# Patient Record
Sex: FEMALE | Race: WHITE | NOT HISPANIC OR LATINO | Employment: FULL TIME | URBAN - METROPOLITAN AREA
[De-identification: names, ages, dates, MRNs, and addresses within clinical notes are randomized per-mention and may not be internally consistent; named-entity substitution may affect disease eponyms.]

---

## 2018-02-21 ENCOUNTER — TRANSFERRED RECORDS (OUTPATIENT)
Dept: HEALTH INFORMATION MANAGEMENT | Facility: CLINIC | Age: 18
End: 2018-02-21

## 2021-08-12 ENCOUNTER — LAB REQUISITION (OUTPATIENT)
Dept: LAB | Facility: CLINIC | Age: 21
End: 2021-08-12

## 2021-08-12 PROCEDURE — 86481 TB AG RESPONSE T-CELL SUSP: CPT | Performed by: INTERNAL MEDICINE

## 2021-08-12 PROCEDURE — 86706 HEP B SURFACE ANTIBODY: CPT | Performed by: INTERNAL MEDICINE

## 2021-08-13 LAB — HBV SURFACE AB SERPL IA-ACNC: 0.52 M[IU]/ML

## 2021-08-14 LAB
GAMMA INTERFERON BACKGROUND BLD IA-ACNC: 0.06 IU/ML
M TB IFN-G BLD-IMP: NEGATIVE
M TB IFN-G CD4+ BCKGRND COR BLD-ACNC: 9.94 IU/ML
MITOGEN IGNF BCKGRD COR BLD-ACNC: 0.01 IU/ML
MITOGEN IGNF BCKGRD COR BLD-ACNC: 0.04 IU/ML
QUANTIFERON MITOGEN: 10 IU/ML
QUANTIFERON NIL TUBE: 0.06 IU/ML
QUANTIFERON TB1 TUBE: 0.1 IU/ML
QUANTIFERON TB2 TUBE: 0.07

## 2022-05-05 ENCOUNTER — LAB (OUTPATIENT)
Dept: LAB | Facility: CLINIC | Age: 22
End: 2022-05-05
Payer: COMMERCIAL

## 2022-05-05 DIAGNOSIS — M06.9 RHEUMATOID ARTHRITIS (H): ICD-10-CM

## 2022-05-05 DIAGNOSIS — M06.9 RHEUMATOID ARTHRITIS (H): Primary | ICD-10-CM

## 2022-05-05 LAB
ERYTHROCYTE [DISTWIDTH] IN BLOOD BY AUTOMATED COUNT: 12.1 % (ref 10–15)
ERYTHROCYTE [SEDIMENTATION RATE] IN BLOOD BY WESTERGREN METHOD: 10 MM/HR (ref 0–20)
HCT VFR BLD AUTO: 40 % (ref 35–47)
HGB BLD-MCNC: 13.7 G/DL (ref 11.7–15.7)
MCH RBC QN AUTO: 31.7 PG (ref 26.5–33)
MCHC RBC AUTO-ENTMCNC: 34.3 G/DL (ref 31.5–36.5)
MCV RBC AUTO: 93 FL (ref 78–100)
PLATELET # BLD AUTO: 232 10E3/UL (ref 150–450)
RBC # BLD AUTO: 4.32 10E6/UL (ref 3.8–5.2)
WBC # BLD AUTO: 6.8 10E3/UL (ref 4–11)

## 2022-05-05 PROCEDURE — 85652 RBC SED RATE AUTOMATED: CPT

## 2022-05-05 PROCEDURE — 85027 COMPLETE CBC AUTOMATED: CPT

## 2022-05-05 PROCEDURE — 86140 C-REACTIVE PROTEIN: CPT

## 2022-05-05 PROCEDURE — 36415 COLL VENOUS BLD VENIPUNCTURE: CPT

## 2022-05-05 PROCEDURE — 82040 ASSAY OF SERUM ALBUMIN: CPT

## 2022-05-05 PROCEDURE — 82565 ASSAY OF CREATININE: CPT

## 2022-05-05 PROCEDURE — 84450 TRANSFERASE (AST) (SGOT): CPT

## 2022-05-06 LAB
ALBUMIN SERPL-MCNC: 3.9 G/DL (ref 3.4–5)
AST SERPL W P-5'-P-CCNC: 26 U/L (ref 0–45)
CREAT SERPL-MCNC: 0.87 MG/DL (ref 0.52–1.04)
CRP SERPL-MCNC: <2.9 MG/L (ref 0–8)
GFR SERPL CREATININE-BSD FRML MDRD: >90 ML/MIN/1.73M2

## 2022-05-17 ENCOUNTER — OFFICE VISIT (OUTPATIENT)
Dept: URGENT CARE | Facility: URGENT CARE | Age: 22
End: 2022-05-17
Payer: COMMERCIAL

## 2022-05-17 VITALS
SYSTOLIC BLOOD PRESSURE: 120 MMHG | TEMPERATURE: 98 F | OXYGEN SATURATION: 98 % | DIASTOLIC BLOOD PRESSURE: 78 MMHG | RESPIRATION RATE: 20 BRPM | HEART RATE: 78 BPM

## 2022-05-17 DIAGNOSIS — M62.830 SPASM OF MUSCLE OF LOWER BACK: ICD-10-CM

## 2022-05-17 DIAGNOSIS — S39.92XA INJURY OF LOW BACK, INITIAL ENCOUNTER: Primary | ICD-10-CM

## 2022-05-17 DIAGNOSIS — R31.9 URINARY TRACT INFECTION WITH HEMATURIA, SITE UNSPECIFIED: ICD-10-CM

## 2022-05-17 DIAGNOSIS — N39.0 URINARY TRACT INFECTION WITH HEMATURIA, SITE UNSPECIFIED: ICD-10-CM

## 2022-05-17 LAB
ALBUMIN UR-MCNC: NEGATIVE MG/DL
APPEARANCE UR: ABNORMAL
BACTERIA #/AREA URNS HPF: ABNORMAL /HPF
BILIRUB UR QL STRIP: NEGATIVE
COLOR UR AUTO: YELLOW
GLUCOSE UR STRIP-MCNC: NEGATIVE MG/DL
HGB UR QL STRIP: NEGATIVE
KETONES UR STRIP-MCNC: NEGATIVE MG/DL
LEUKOCYTE ESTERASE UR QL STRIP: ABNORMAL
NITRATE UR QL: NEGATIVE
PH UR STRIP: 8 [PH] (ref 5–7)
RBC #/AREA URNS AUTO: ABNORMAL /HPF
SP GR UR STRIP: 1.01 (ref 1–1.03)
SQUAMOUS #/AREA URNS AUTO: ABNORMAL /LPF
UROBILINOGEN UR STRIP-ACNC: 0.2 E.U./DL
WBC #/AREA URNS AUTO: ABNORMAL /HPF

## 2022-05-17 PROCEDURE — 99204 OFFICE O/P NEW MOD 45 MIN: CPT | Performed by: PHYSICIAN ASSISTANT

## 2022-05-17 PROCEDURE — 81001 URINALYSIS AUTO W/SCOPE: CPT | Performed by: PHYSICIAN ASSISTANT

## 2022-05-17 PROCEDURE — 87086 URINE CULTURE/COLONY COUNT: CPT | Performed by: PHYSICIAN ASSISTANT

## 2022-05-17 RX ORDER — NITROFURANTOIN 25; 75 MG/1; MG/1
100 CAPSULE ORAL 2 TIMES DAILY
Qty: 10 CAPSULE | Refills: 0 | Status: SHIPPED | OUTPATIENT
Start: 2022-05-17 | End: 2022-05-22

## 2022-05-17 RX ORDER — ADALIMUMAB 40MG/0.4ML
40 KIT SUBCUTANEOUS
COMMUNITY
Start: 2021-10-12 | End: 2023-06-13

## 2022-05-17 RX ORDER — METHOCARBAMOL 500 MG/1
500 TABLET, FILM COATED ORAL 3 TIMES DAILY
Qty: 28 TABLET | Refills: 0 | Status: SHIPPED | OUTPATIENT
Start: 2022-05-17 | End: 2023-04-10

## 2022-05-17 RX ORDER — IBUPROFEN 600 MG/1
600 TABLET, FILM COATED ORAL EVERY 6 HOURS PRN
Qty: 30 TABLET | Refills: 0 | Status: SHIPPED | OUTPATIENT
Start: 2022-05-17 | End: 2023-04-10

## 2022-05-17 NOTE — PROGRESS NOTES
Assessment & Plan     Injury of low back, initial encounter  Lumbar xray Negative for acute findings, read by Alexis Abbott PA-C [facility] at time of visit.  Ice compresses and warm moist compresses  Motrin for inflammation and pain    - ibuprofen (ADVIL/MOTRIN) 600 MG tablet; Take 1 tablet (600 mg) by mouth every 6 hours as needed for moderate pain    Spasm of muscle of lower back  Robaxin for muscle spasms prn  - XR Lumbar Spine 2/3 Views; Future  - ibuprofen (ADVIL/MOTRIN) 600 MG tablet; Take 1 tablet (600 mg) by mouth every 6 hours as needed for moderate pain  - methocarbamol (ROBAXIN) 500 MG tablet; Take 1 tablet (500 mg) by mouth 3 times daily    Urinary tract infection with hematuria, site unspecified  Urine normally doesn't have any germs (bacteria) in it. But bacteria can get into the urinary tract from the skin around the rectum. Or they can travel in the blood from other parts of the body. Once they are in your urinary tract, they can cause infection in these areas:    The urethra (urethritis)    The bladder (cystitis)    The kidneys (pyelonephritis)  The most common place for an infection is in the bladder. This is called a bladder infection. This is one of the most common infections in women. Most bladder infections are easily treated. They are not serious unless the infection spreads to the kidney.  The terms bladder infection, UTI, and cystitis are often used to describe the same thing. But they are not always the same. Cystitis is an inflammation of the bladder. The most common cause of cystitis is an infection.    - nitroFURantoin macrocrystal-monohydrate (MACROBID) 100 MG capsule; Take 1 capsule (100 mg) by mouth 2 times daily for 5 days       At today's visit with William Payne , we discussed results, diagnosis, medications and formulated a plan.  We also discussed red flags for immediate return to clinic/ER, as well as indications for follow up if no improvement. Patient understood and agreed to  plan. William Payne was discharged with stable vitals and has no further questions.       No follow-ups on file.    Alexis Abbott, [facility], PA-C  [facility] [place]    Results for orders placed or performed in visit on 05/17/22   XR Lumbar Spine 2/3 Views     Status: None (Preliminary result)    Narrative    LUMBAR SPINE TWO - THREE VIEWS 5/17/2022 10:38 AM     COMPARISON: None    HISTORY: Injury of low back, initial encounter; Spasm of muscle of  lower back.      Impression    IMPRESSION: Five lumbar type vertebrae. Normal alignment. Vertebral  body heights normal. No fractures. No significant degenerative change.   Results for orders placed or performed in visit on 05/17/22   UA with Microscopic reflex to Culture     Status: Abnormal    Specimen: Urine, Clean Catch   Result Value Ref Range    Color Urine Yellow Colorless, Straw, Light Yellow, Yellow    Appearance Urine Slightly Cloudy (A) Clear    Glucose Urine Negative Negative mg/dL    Bilirubin Urine Negative Negative    Ketones Urine Negative Negative mg/dL    Specific Gravity Urine 1.015 1.003 - 1.035    Blood Urine Negative Negative    pH Urine 8.0 (H) 5.0 - 7.0    Protein Albumin Urine Negative Negative mg/dL    Urobilinogen Urine 0.2 0.2, 1.0 E.U./dL    Nitrite Urine Negative Negative    Leukocyte Esterase Urine Small (A) Negative   Urine Microscopic     Status: Abnormal   Result Value Ref Range    Bacteria Urine Many (A) None Seen /HPF    RBC Urine 0-2 0-2 /HPF /HPF    WBC Urine 10-25 (A) 0-5 /HPF /HPF    Squamous Epithelials Urine Many (A) None Seen /LPF       Subjective   William is a 21 year old who presents for the following health issues     HPI     William Payne, 21 year old, female presents to the urgent care today with:   Musculoskeletal Problem (Back pain pt was lifting weights at the gym )      Review of Systems   Constitutional, HEENT, cardiovascular, pulmonary, gi and gu systems are negative, except as otherwise noted.       Objective    /78   Pulse 78   Temp 98  F (36.7  C)   Resp 20   SpO2 98%   There is no height or weight on file to calculate BMI.  Physical Exam   GENERAL: healthy, alert and no distress  ABDOMEN: soft, nontender, no hepatosplenomegaly, no masses and bowel sounds normal  MS: Positive for lower back tenderness and spasms  SKIN: no suspicious lesions or rashes  NEURO: Normal strength and tone, mentation intact and speech normal  PSYCH: mentation appears normal, affect normal/bright

## 2022-05-18 LAB — BACTERIA UR CULT: NORMAL

## 2022-08-26 ENCOUNTER — OFFICE VISIT (OUTPATIENT)
Dept: URGENT CARE | Facility: URGENT CARE | Age: 22
End: 2022-08-26
Payer: COMMERCIAL

## 2022-08-26 VITALS
DIASTOLIC BLOOD PRESSURE: 69 MMHG | OXYGEN SATURATION: 99 % | TEMPERATURE: 99.8 F | HEART RATE: 90 BPM | SYSTOLIC BLOOD PRESSURE: 110 MMHG

## 2022-08-26 DIAGNOSIS — J02.0 STREPTOCOCCAL PHARYNGITIS: Primary | ICD-10-CM

## 2022-08-26 DIAGNOSIS — R07.0 THROAT PAIN: ICD-10-CM

## 2022-08-26 LAB — DEPRECATED S PYO AG THROAT QL EIA: POSITIVE

## 2022-08-26 PROCEDURE — 87880 STREP A ASSAY W/OPTIC: CPT

## 2022-08-26 PROCEDURE — 99213 OFFICE O/P EST LOW 20 MIN: CPT | Performed by: FAMILY MEDICINE

## 2022-08-26 RX ORDER — PENICILLIN V POTASSIUM 500 MG/1
500 TABLET, FILM COATED ORAL 3 TIMES DAILY
Qty: 30 TABLET | Refills: 0 | Status: SHIPPED | OUTPATIENT
Start: 2022-08-26 | End: 2022-09-05

## 2022-08-26 NOTE — PATIENT INSTRUCTIONS
Take Tylenol and/or Ibuprofen    Do warm saltwater gargles    Drink plenty of ice-cold water to soothe the throat pain.      Follow up if not better in 7-10 days.

## 2022-08-26 NOTE — PROGRESS NOTES
SUBJECTIVE:   William Payne is a 22 year old female presenting with a chief complaint of fevers (up to 100.6 F) since two days ago and sore throat (worse with swallowing) for the past 2-3 days. .  Course of illness is worsening. .    Current and Associated symptoms: as listed above.  No stuffy nose.  No runny nose.  No loss of smell/taste.  No cough.  No bluish lips/toes/finers.  No vomiting/diarrhea.  No chest pain.  No abdominal pain.    Treatment measures tried include Tylenol, Dayquil, Nyquil.  .    Her August 23, 2022, at-home COVID-19 test was negative.  Patient has already already received a booster dose of a COVID-19 Pfizer vaccine.        Past Medical History:    Rheumatoid Arthritis.      Current Outpatient Medications   Medication Sig Dispense Refill     adalimumab (HUMIRA *CF* PEN) 40 MG/0.4ML pen kit Inject 40 mg Subcutaneous       ibuprofen (ADVIL/MOTRIN) 600 MG tablet Take 1 tablet (600 mg) by mouth every 6 hours as needed for moderate pain (Patient not taking: Reported on 8/26/2022) 30 tablet 0     methocarbamol (ROBAXIN) 500 MG tablet Take 1 tablet (500 mg) by mouth 3 times daily (Patient not taking: Reported on 8/26/2022) 28 tablet 0     Social History     Tobacco Use     Smoking status: Not on file     Smokeless tobacco: Not on file   Substance Use Topics     Alcohol use: Not on file       ROS:  CONSTITUTIONAL:positive for recent fevers.    ENT/MOUTH:  Positive for sore throat.    NEURO: positive for recent headache.      OBJECTIVE:  /69   Pulse 90   Temp 99.8  F (37.7  C) (Tympanic)   SpO2 99%   Breastfeeding No   GEN:  No acute distress.  No respiratory distress.    ear nose throat:  TMs and canals are within normal limits.  Oropharynx is erythematous without tonsillar exudates.  NECK:  There is some tenderness at the submandibular regions.      LAB:    Results for orders placed or performed in visit on 08/26/22   Streptococcus A Rapid Screen w/Reflex to PCR     Status: Abnormal     Specimen: Throat; Swab   Result Value Ref Range    Group A Strep antigen Positive (A) Negative         ASSESSMENT:  Strep Pharyngitis    PLAN:  Rx:  Penicillin VK.    Tylenol, ibuprofen  Warm saltwater gargles  Drink ice-cold water  follow up if not better in 7-10 days.       Timoteo Neri MD

## 2022-08-26 NOTE — LETTER
[facility] [place]  [address]  [address]  [place] [address] [phone]      August 26, 2022    RE:  William Payne                                                                                                                                                       [address]    [place] [address]            To whom it may concern:    William Payne is under my professional care at the [facility].  Because of her current medical illness, please excuse her absences from work on August 25 and August 26, 2022  She  may return to work on August 30, 2022, provided that she is feeling better.          Sincerely,        Timoteo Neri MD    [facility]gent [facility]

## 2022-09-17 ENCOUNTER — HEALTH MAINTENANCE LETTER (OUTPATIENT)
Age: 22
End: 2022-09-17

## 2022-11-18 ENCOUNTER — TRANSFERRED RECORDS (OUTPATIENT)
Dept: HEALTH INFORMATION MANAGEMENT | Facility: CLINIC | Age: 22
End: 2022-11-18

## 2022-11-18 ENCOUNTER — MEDICAL CORRESPONDENCE (OUTPATIENT)
Dept: HEALTH INFORMATION MANAGEMENT | Facility: CLINIC | Age: 22
End: 2022-11-18

## 2023-02-22 NOTE — TELEPHONE ENCOUNTER
NOTES Status Details   OFFICE NOTE from referring provider Care Everywhere /Received 11.18.2022 Leilani Leung, APRN, CNP [facility]    OFFICE NOTE from other specialist Care Everywhere 04.15.2022 Zoraida Darden APRN, CNP [facility]    03.10.2021 Tim Starr MD    DISCHARGE SUMMARY from hospital     DISCHARGE REPORT from the ER     MEDICATION LIST Care Everywhere    LABS (Any and all labs)      Care Everywhere    Biopsy/pathology (Anything related to diagnoses I.e. fluid aspirations, lip biopsy, muscle biopsy)               Imaging (All imaging related to diagnoses)     Echo     HRCT     CXR     EMG                    Scleroderma/Dermatomyositis diagnoses     Previous Cardiology notes      Previous Pulmonary notes     Previous Dermatology notes     Previous GI notes     Lupus diagnoses     Previous Nephrology notes     Previous Dermatology notes     Previous Cardiology notes

## 2023-04-10 ENCOUNTER — LAB (OUTPATIENT)
Dept: LAB | Facility: CLINIC | Age: 23
End: 2023-04-10
Attending: STUDENT IN AN ORGANIZED HEALTH CARE EDUCATION/TRAINING PROGRAM
Payer: COMMERCIAL

## 2023-04-10 ENCOUNTER — OFFICE VISIT (OUTPATIENT)
Dept: RHEUMATOLOGY | Facility: CLINIC | Age: 23
End: 2023-04-10
Attending: STUDENT IN AN ORGANIZED HEALTH CARE EDUCATION/TRAINING PROGRAM
Payer: COMMERCIAL

## 2023-04-10 ENCOUNTER — TELEPHONE (OUTPATIENT)
Dept: RHEUMATOLOGY | Facility: CLINIC | Age: 23
End: 2023-04-10

## 2023-04-10 VITALS
BODY MASS INDEX: 21.41 KG/M2 | HEART RATE: 88 BPM | WEIGHT: 128.53 LBS | OXYGEN SATURATION: 98 % | DIASTOLIC BLOOD PRESSURE: 81 MMHG | SYSTOLIC BLOOD PRESSURE: 121 MMHG | HEIGHT: 65 IN

## 2023-04-10 DIAGNOSIS — R76.8 CYCLIC CITRULLINATED PEPTIDE (CCP) ANTIBODY POSITIVE: ICD-10-CM

## 2023-04-10 DIAGNOSIS — M05.79 RHEUMATOID ARTHRITIS INVOLVING MULTIPLE SITES WITH POSITIVE RHEUMATOID FACTOR (H): Primary | ICD-10-CM

## 2023-04-10 DIAGNOSIS — Z79.899 HIGH RISK MEDICATION USE: ICD-10-CM

## 2023-04-10 DIAGNOSIS — M05.79 RHEUMATOID ARTHRITIS INVOLVING MULTIPLE SITES WITH POSITIVE RHEUMATOID FACTOR (H): ICD-10-CM

## 2023-04-10 LAB
ALP SERPL-CCNC: 43 U/L (ref 35–104)
ALT SERPL W P-5'-P-CCNC: 22 U/L (ref 10–35)
AST SERPL W P-5'-P-CCNC: 37 U/L (ref 10–35)
BASOPHILS # BLD AUTO: 0 10E3/UL (ref 0–0.2)
BASOPHILS NFR BLD AUTO: 1 %
CREAT SERPL-MCNC: 0.8 MG/DL (ref 0.51–0.95)
EOSINOPHIL # BLD AUTO: 0.3 10E3/UL (ref 0–0.7)
EOSINOPHIL NFR BLD AUTO: 4 %
ERYTHROCYTE [DISTWIDTH] IN BLOOD BY AUTOMATED COUNT: 11.8 % (ref 10–15)
GFR SERPL CREATININE-BSD FRML MDRD: >90 ML/MIN/1.73M2
HCT VFR BLD AUTO: 41 % (ref 35–47)
HGB BLD-MCNC: 14.2 G/DL (ref 11.7–15.7)
IMM GRANULOCYTES # BLD: 0 10E3/UL
IMM GRANULOCYTES NFR BLD: 0 %
LYMPHOCYTES # BLD AUTO: 2.8 10E3/UL (ref 0.8–5.3)
LYMPHOCYTES NFR BLD AUTO: 43 %
MCH RBC QN AUTO: 31.2 PG (ref 26.5–33)
MCHC RBC AUTO-ENTMCNC: 34.6 G/DL (ref 31.5–36.5)
MCV RBC AUTO: 90 FL (ref 78–100)
MONOCYTES # BLD AUTO: 0.4 10E3/UL (ref 0–1.3)
MONOCYTES NFR BLD AUTO: 6 %
NEUTROPHILS # BLD AUTO: 3 10E3/UL (ref 1.6–8.3)
NEUTROPHILS NFR BLD AUTO: 46 %
NRBC # BLD AUTO: 0 10E3/UL
NRBC BLD AUTO-RTO: 0 /100
PLATELET # BLD AUTO: 224 10E3/UL (ref 150–450)
RBC # BLD AUTO: 4.55 10E6/UL (ref 3.8–5.2)
WBC # BLD AUTO: 6.5 10E3/UL (ref 4–11)

## 2023-04-10 PROCEDURE — 99205 OFFICE O/P NEW HI 60 MIN: CPT | Performed by: STUDENT IN AN ORGANIZED HEALTH CARE EDUCATION/TRAINING PROGRAM

## 2023-04-10 PROCEDURE — 36415 COLL VENOUS BLD VENIPUNCTURE: CPT

## 2023-04-10 PROCEDURE — 82565 ASSAY OF CREATININE: CPT

## 2023-04-10 PROCEDURE — 84450 TRANSFERASE (AST) (SGOT): CPT

## 2023-04-10 PROCEDURE — 84460 ALANINE AMINO (ALT) (SGPT): CPT

## 2023-04-10 PROCEDURE — 86200 CCP ANTIBODY: CPT

## 2023-04-10 PROCEDURE — 86431 RHEUMATOID FACTOR QUANT: CPT

## 2023-04-10 PROCEDURE — 84075 ASSAY ALKALINE PHOSPHATASE: CPT

## 2023-04-10 PROCEDURE — 85018 HEMOGLOBIN: CPT

## 2023-04-10 PROCEDURE — 99417 PROLNG OP E/M EACH 15 MIN: CPT | Performed by: STUDENT IN AN ORGANIZED HEALTH CARE EDUCATION/TRAINING PROGRAM

## 2023-04-10 PROCEDURE — G0463 HOSPITAL OUTPT CLINIC VISIT: HCPCS | Performed by: STUDENT IN AN ORGANIZED HEALTH CARE EDUCATION/TRAINING PROGRAM

## 2023-04-10 RX ORDER — DROSPIRENONE AND ETHINYL ESTRADIOL 0.02-3(28)
1 KIT ORAL DAILY
COMMUNITY
Start: 2023-01-31 | End: 2023-09-07

## 2023-04-10 RX ORDER — AZELAIC ACID 0.15 G/G
GEL TOPICAL 2 TIMES DAILY
COMMUNITY
Start: 2022-04-05 | End: 2023-09-07

## 2023-04-10 ASSESSMENT — PAIN SCALES - GENERAL: PAINLEVEL: NO PAIN (0)

## 2023-04-10 NOTE — PROGRESS NOTES
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 Haas in July 2014 due to bilateral shoulder pain and hand pain with swelling. Laboratory data noted a positive FIOR of 2.52, a CCP antibody greater than 250 with a negative rheumatoid factor, negative Lyme, normal CRP and sed rate, normal vitamin D level, negative HLA-B27, and normal cell counts. She also had a negative dsDNA slightly low C4 at 12 (normal 13 and above) with a normal C3. Patient has most recently followed with Dr. Starr in the rheumatology department, and her last appointment was 4/15/2022 with JACE Oh CNP.    Julito in high school, officially diagnosed at 13. Aches and pain in random areas. Lower back, then alternating wrists, sternoclavicular joints. Volleyball, basketball, ran track and kept attributing it to sports injuries. Was started on plaquenil, then started methotrexate (7-8 tablets once a week), then switched to humira + plaquenil, . Triggers for flare are stress, sweets, poor sleep. Stopped plaquenil after college and has been doing well. Currently no morning stiffness, no stiffness throughout the day, no pain. When she flares it's her ternoclavicular and wrists, this responds to ibuprofen. Overall, involved joints have included groin (unclear if projecting from SIJ v. Hip), shoulders, wrists, MCPs, sternoclavicular.    No raynaud's, no miscarriage history, no pregnancies.    ROS: 10 point ROS neg other than the symptoms noted above in the HPI.   ALLERGIES: Patient has no known allergies.   MEDS: personally reviewed, notable for Humira every 14 days    PRIOR RHEUM MEDS:  Plaquenil 10/20/2014-Fall 2021 (did not feel it was needed any longer, 7 years total)  Methotrexate 11/20/2015-2019: this worked but caused oral ulcers despite folic acid and some hair loss  Humira  "2019-present      PMHx:  No past medical history on file.     PSHx:  No past surgical history on file.    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   Mother with psoriatic arthritis  Maternal grandmother with rheumatoid arthritis, as does aunt      PHYSICAL EXAM  Vitals: /81 (BP Location: Left arm, Patient Position: Sitting, Cuff Size: Adult Regular)   Pulse 88   Ht 1.651 m (5' 5\")   Wt 58.3 kg (128 lb 8.5 oz)   SpO2 98%   BMI 21.39 kg/m    BMI= Body mass index is 21.39 kg/m .   General: awake, alert  HEENT: face symmetric, EOMI, sclera anicteric and without injection  Resp: breathing comfortably on room air  CV: warm, well perfused extremities  MSK: on swelling or erythema or tenderness of DIPs, PIPs, MCPs, wrists. Right sternoclavicular joint feels and appears larger than left but it not painful and full ROM of right arm intact.  Skin: no rashes noted on visible skin of face, ears, arms      LAB REVIEW:      Latest Ref Rng & Units 8/12/2021     3:11 PM 5/5/2022     1:02 PM   RHEUM RESULTS   Albumin 3.4 - 5.0 g/dL  3.9     AST 0 - 45 U/L  26     Creatinine 0.52 - 1.04 mg/dL  0.87     CRP Inflammation 0.0 - 8.0 mg/L  <2.9     GFR Estimate >60 mL/min/1.73m2  >90     Hematocrit 35.0 - 47.0 %  40.0     Hemoglobin 11.7 - 15.7 g/dL  13.7     WBC 4.0 - 11.0 10e3/uL  6.8     RBC Count 3.80 - 5.20 10e6/uL  4.32     RDW 10.0 - 15.0 %  12.1     MCHC 31.5 - 36.5 g/dL  34.3     MCV 78 - 100 fL  93     Platelet Count 150 - 450 10e3/uL  232     Sed Rate 0 - 20 mm/hr  10     Quantiferon-TB Gold Plus Result Negative Negative        She has had low C4s in the past.    No results found for: YECENIA, ANAP1, ANAT1, ANAP2, ANAT2, ANAP3, ANAT3, RHF, CCPIGG, DNA, RNPIGG, SMIGG, SSAIGG, SSBIGG, RNAPG, SCLIGG, CENTA, HSTO, B2GPG, B2GPM, B2GPA, LUPINT, CARDG, CARDM, CARDA, ANCAT, ANCAP, PR3IGG, " MPOIGG     Lab Results   Component Value Date/Time    TBRES Negative 2021 03:11 PM   2022 HSV-1 positive  2022 COVID-19+  22 HPV+, endocervix with CIN1 on 1/23/23  4/15/2022 Quant gold negative     IMAGIN22 right hand XR in [facility], cannot review images personally: No fractures or DJD changes. No bony erosions. No abnormalities of the third MP joints. The wrist is normal   19 Right upper extremity US in [facility] noted for triceps muscle tear      ASSESSMENT: 21 yo with CCP+ POPPY that is the childhood equivalent of Rheumatoid arthritis (RA) who is well controlled on humira.    DISCUSSION: Her POPPY involves both small and large joints, ranging from MCPS and wrists to shoulders, possibly hips, and sternoclavicular. Given sternoclavicular involvement, SAPHO is on the differential however this joint is more frequently involved in RA than was previously recognized and is now able to be identified by ultrasound (see https://doi.org/10.1002/acr.65190, particularly synovitis and erosions). Will repeat CCP and RF today to confirm noted labs as CCP titer corresponds with severity of disease and her transiently low (now normalized) C4s may represent immune complex activation via RF. She does have a history of a positive FIOR but this is common in RA and she has no symptoms concerning for connective tissue diseases such as lupus nor has she had any issues with eye inflammation. We did briefly discuss that I would appreciate a heads up when she starts to think about pregnancy. Finally, we discussed that as needed use of ibuprofen is safe but that if she finds herself using it very often to let me know.    DIAGNOSIS:  ## Rheumatoid Arthritis/POPPY, +CCP, transient low C4, involving groin (unclear if projecting from SIJ v. Hip), shoulders, wrists (bilateral, typically alternating), MCPs (seb right 2nd MCP), R. Sternoclavicular  ## positive FIOR without eye inflammation history  ## high risk  medication use    PLAN:  1. I will take over humira prescription today  2. Standing labs for medication monitoring places and to be drawn every 3-4 months    RTC in 6 months, sooner if needed  Alex Johnson MD, PhD  Rheumatology     80 minutes were spent on the date of encounter doing chart review, history and exam, documentation, care coordination, and further activities as noted above.

## 2023-04-10 NOTE — LETTER
4/10/2023       RE: William Payne  [address]  [address]  [place] [address]     Dear Colleague,    Thank you for referring your patient, William Payne, to the [facility] RHEUMATOLOGY at [facility]. Please see a copy of my visit note below.    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 Haas in July 2014 due to bilateral shoulder pain and hand pain with swelling. Laboratory data noted a positive FIOR of 2.52, a CCP antibody greater than 250 with a negative rheumatoid factor, negative Lyme, normal CRP and sed rate, normal vitamin D level, negative HLA-B27, and normal cell counts. She also had a negative dsDNA slightly low C4 at 12 (normal 13 and above) with a normal C3. Patient has most recently followed with Dr. Starr in the rheumatology department, and her last appointment was 4/15/2022 with JACE Oh CNP.    Julito in high school, officially diagnosed at 13. Aches and pain in random areas. Lower back, then alternating wrists, sternoclavicular joints. Volleyball, basketball, ran track and kept attributing it to sports injuries. Was started on plaquenil, then started methotrexate (7-8 tablets once a week), then switched to humira + plaquenil, . Triggers for flare are stress, sweets, poor sleep. Stopped plaquenil after college and has been doing well. Currently no morning stiffness, no stiffness throughout the day, no pain. When she flares it's her ternoclavicular and wrists, this responds to ibuprofen. Overall, involved joints have included groin (unclear if projecting from SIJ v. Hip), shoulders, wrists, MCPs, sternoclavicular.    No raynaud's, no miscarriage history, no pregnancies.    ROS: 10 point ROS neg other than the symptoms noted above in the HPI.   ALLERGIES:  "Patient has no known allergies.   MEDS: personally reviewed, notable for Humira every 14 days    PRIOR RHEUM MEDS:  Plaquenil 10/20/2014-Fall 2021 (did not feel it was needed any longer, 7 years total)  Methotrexate 11/20/2015-2019: this worked but caused oral ulcers despite folic acid and some hair loss  Humira 2019-present      PMHx:  No past medical history on file.     PSHx:  No past surgical history on file.    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   Mother with psoriatic arthritis  Maternal grandmother with rheumatoid arthritis, as does aunt      PHYSICAL EXAM  Vitals: /81 (BP Location: Left arm, Patient Position: Sitting, Cuff Size: Adult Regular)   Pulse 88   Ht 1.651 m (5' 5\")   Wt 58.3 kg (128 lb 8.5 oz)   SpO2 98%   BMI 21.39 kg/m    BMI= Body mass index is 21.39 kg/m .   General: awake, alert  HEENT: face symmetric, EOMI, sclera anicteric and without injection  Resp: breathing comfortably on room air  CV: warm, well perfused extremities  MSK: on swelling or erythema or tenderness of DIPs, PIPs, MCPs, wrists. Right sternoclavicular joint feels and appears larger than left but it not painful and full ROM of right arm intact.  Skin: no rashes noted on visible skin of face, ears, arms      LAB REVIEW:      Latest Ref Rng & Units 8/12/2021     3:11 PM 5/5/2022     1:02 PM   RHEUM RESULTS   Albumin 3.4 - 5.0 g/dL  3.9     AST 0 - 45 U/L  26     Creatinine 0.52 - 1.04 mg/dL  0.87     CRP Inflammation 0.0 - 8.0 mg/L  <2.9     GFR Estimate >60 mL/min/1.73m2  >90     Hematocrit 35.0 - 47.0 %  40.0     Hemoglobin 11.7 - 15.7 g/dL  13.7     WBC 4.0 - 11.0 10e3/uL  6.8     RBC Count 3.80 - 5.20 10e6/uL  4.32     RDW 10.0 - 15.0 %  12.1     MCHC 31.5 - 36.5 g/dL  34.3     MCV 78 - 100 fL  93     Platelet Count 150 - 450 10e3/uL  232     Sed Rate 0 - 20 mm/hr  10   "   Quantiferon-TB Gold Plus Result Negative Negative        She has had low C4s in the past.    No results found for: YECENIA, ANAP1, ANAT1, ANAP2, ANAT2, ANAP3, ANAT3, RHF, CCPIGG, DNA, RNPIGG, SMIGG, SSAIGG, SSBIGG, RNAPG, SCLIGG, CENTA, HSTO, B2GPG, B2GPM, B2GPA, LUPINT, CARDG, CARDM, CARDA, ANCAT, ANCAP, PR3IGG, MPOIGG     Lab Results   Component Value Date/Time    TBRES Negative 2021 03:11 PM   2022 HSV-1 positive  2022 COVID-19+  22 HPV+, endocervix with CIN1 on 1/23/23  4/15/2022 Quant gold negative     IMAGIN22 right hand XR in [facility], cannot review images personally: No fractures or DJD changes. No bony erosions. No abnormalities of the third MP joints. The wrist is normal   19 Right upper extremity US in [facility] noted for triceps muscle tear      ASSESSMENT: 23 yo with CCP+ POPPY that is the childhood equivalent of Rheumatoid arthritis (RA) who is well controlled on humira.    DISCUSSION: Her POPPY involves both small and large joints, ranging from MCPS and wrists to shoulders, possibly hips, and sternoclavicular. Given sternoclavicular involvement, SAPHO is on the differential however this joint is more frequently involved in RA than was previously recognized and is now able to be identified by ultrasound (see https://doi.org/10.1002/acr.06758, particularly synovitis and erosions). Will repeat CCP and RF today to confirm noted labs as CCP titer corresponds with severity of disease and her transiently low (now normalized) C4s may represent immune complex activation via RF. She does have a history of a positive FIOR but this is common in RA and she has no symptoms concerning for connective tissue diseases such as lupus nor has she had any issues with eye inflammation. We did briefly discuss that I would appreciate a heads up when she starts to think about pregnancy. Finally, we discussed that as needed use of ibuprofen is safe but that if she finds herself using it very often  to let me know.    DIAGNOSIS:  ## Rheumatoid Arthritis/POPPY, +CCP, transient low C4, involving groin (unclear if projecting from SIJ v. Hip), shoulders, wrists (bilateral, typically alternating), MCPs (seb right 2nd MCP), R. Sternoclavicular  ## positive FIOR without eye inflammation history  ## high risk medication use    PLAN:  1. I will take over humira prescription today  2. Standing labs for medication monitoring places and to be drawn every 3-4 months    RTC in 6 months, sooner if needed  Alex Johnson MD, PhD  Rheumatology     80 minutes were spent on the date of encounter doing chart review, history and exam, documentation, care coordination, and further activities as noted above.

## 2023-04-10 NOTE — PATIENT INSTRUCTIONS
Welcome to our rheumatology practice!  I have ordered labs for today and medication monitoring labs to be done every 3-4 months.  I will take over the humira prescription  See you in 6 months

## 2023-04-10 NOTE — NURSING NOTE
"Chief Complaint   Patient presents with     Consult     Consult for polyarticular juvenile arthritis     /81 (BP Location: Left arm, Patient Position: Sitting, Cuff Size: Adult Regular)   Pulse 88   Ht 1.651 m (5' 5\")   Wt 58.3 kg (128 lb 8.5 oz)   SpO2 98%   BMI 21.39 kg/m      Milagros Hewitt on 4/10/2023 at 11:33 AM    "

## 2023-04-11 LAB — RHEUMATOID FACT SER NEPH-ACNC: <7 IU/ML

## 2023-04-12 ENCOUNTER — MYC MEDICAL ADVICE (OUTPATIENT)
Dept: RHEUMATOLOGY | Facility: CLINIC | Age: 23
End: 2023-04-12
Payer: COMMERCIAL

## 2023-04-12 LAB — CCP AB SER IA-ACNC: 217 U/ML

## 2023-04-13 NOTE — TELEPHONE ENCOUNTER
MyC acknowledgement message sent to patient noting her questions on the elevated CCP and AST have been routed to Dr. Buckley for review.    Rashmi Barahona RN  Rheumatology Clinic

## 2023-05-11 ENCOUNTER — MYC MEDICAL ADVICE (OUTPATIENT)
Dept: RHEUMATOLOGY | Facility: CLINIC | Age: 23
End: 2023-05-11
Payer: COMMERCIAL

## 2023-05-11 DIAGNOSIS — M05.79 RHEUMATOID ARTHRITIS INVOLVING MULTIPLE SITES WITH POSITIVE RHEUMATOID FACTOR (H): Primary | ICD-10-CM

## 2023-05-11 NOTE — TELEPHONE ENCOUNTER
Patient reporting flare symptoms that she woke up with this am, had not noticed any symptoms prior.      Right Thumb painful, stiff, some swelling    Chart Review:  Established w/ Adult Rheum 4/10 Dr. Buckley    Dx: Rheumatoid Arthritis/POPPY  +CCP, transient low C4, involving groin (unclear if projecting from SIJ v. Hip), shoulders, wrists (bilateral, typically alternating), MCPs (seb right 2nd MCP), R. Sternoclavicular  positive FIOR without eye inflammation history    Current Med:  Humira 40mg INJ every 14 days    Last labs 4/10 continuing to monitor her AST    Patient to have follow up w/ Dr. Hernandez and is on the wait list.    Route to provider for review and MyC acknowledgment response to patient.    Rashmi Barahona RN  Rheumatology Clinic

## 2023-05-12 RX ORDER — PREDNISONE 5 MG/1
TABLET ORAL
Qty: 18 TABLET | Refills: 0 | Status: SHIPPED | OUTPATIENT
Start: 2023-05-12 | End: 2023-05-21

## 2023-05-12 NOTE — TELEPHONE ENCOUNTER
Patient updated per Dr. Buckley's message:    Start it today at whatever time then she should move the dose to mornings.   15 mg x 3 days   10 mg x 3 days   5 mg x 3 days   If symptoms recur as she steps down she should let us know and we can slow the taper     Rashmi Barahona RN  Rheumatology Clinic

## 2023-05-18 ENCOUNTER — PRE VISIT (OUTPATIENT)
Dept: RHEUMATOLOGY | Facility: CLINIC | Age: 23
End: 2023-05-18
Payer: COMMERCIAL

## 2023-05-19 NOTE — TELEPHONE ENCOUNTER
LVM and MyC message sent to check on patient symptoms after starting the Prednisone taper last week.    Rashmi Barahona RN  Rheumatology Clinic

## 2023-06-13 ENCOUNTER — TELEPHONE (OUTPATIENT)
Dept: RHEUMATOLOGY | Facility: CLINIC | Age: 23
End: 2023-06-13
Payer: COMMERCIAL

## 2023-06-13 DIAGNOSIS — M05.79 RHEUMATOID ARTHRITIS INVOLVING MULTIPLE SITES WITH POSITIVE RHEUMATOID FACTOR (H): ICD-10-CM

## 2023-06-13 DIAGNOSIS — R76.8 CYCLIC CITRULLINATED PEPTIDE (CCP) ANTIBODY POSITIVE: ICD-10-CM

## 2023-06-13 NOTE — TELEPHONE ENCOUNTER
[facility] Call Center    Phone Message    May a detailed message be left on voicemail: yes     Reason for Call: Medication Question or concern regarding medication   Prescription Clarification   Name of Medication: Humira    Prescribing Provider: Dr. Johnson     Pharmacy: [facility]     What on the order needs clarification? Pt was told by [facility] that she cannot receive her medications through [facility] with her current insurance.    Pt needs to use only [facility].     Please contact the Pt back with any questions or concerns.      Action Taken: Message routed to:  [facility] ([facility] Adult Rheumatology

## 2023-06-13 NOTE — TELEPHONE ENCOUNTER
Resent Humira order to correct specialty pharmacy per [facility]. Unclear why prescription was [facility] as she has never filled there. Called and LM for patient informing her of sent prescription and provided phone number of [facility] specialty pharmacy for her to contact to rush next refill shipment.     Nya Michael, PharmD  Medication Therapy Management Pharmacist  [facility] Rheumatology [facility]

## 2023-06-14 ENCOUNTER — OFFICE VISIT (OUTPATIENT)
Dept: URGENT CARE | Facility: URGENT CARE | Age: 23
End: 2023-06-14
Payer: COMMERCIAL

## 2023-06-14 ENCOUNTER — NURSE TRIAGE (OUTPATIENT)
Dept: NURSING | Facility: CLINIC | Age: 23
End: 2023-06-14
Payer: COMMERCIAL

## 2023-06-14 VITALS
HEART RATE: 115 BPM | WEIGHT: 128 LBS | OXYGEN SATURATION: 99 % | TEMPERATURE: 99.9 F | BODY MASS INDEX: 21.3 KG/M2 | DIASTOLIC BLOOD PRESSURE: 73 MMHG | SYSTOLIC BLOOD PRESSURE: 128 MMHG

## 2023-06-14 DIAGNOSIS — J02.9 VIRAL PHARYNGITIS: Primary | ICD-10-CM

## 2023-06-14 LAB
DEPRECATED S PYO AG THROAT QL EIA: NEGATIVE
GROUP A STREP BY PCR: NOT DETECTED

## 2023-06-14 PROCEDURE — 87651 STREP A DNA AMP PROBE: CPT | Performed by: PHYSICIAN ASSISTANT

## 2023-06-14 PROCEDURE — 99213 OFFICE O/P EST LOW 20 MIN: CPT | Performed by: PHYSICIAN ASSISTANT

## 2023-06-14 NOTE — TELEPHONE ENCOUNTER
Patient states that she gets strep a lot.  Patient had strep 4 times in the past 10 months.  Patient today has a sore throat and popping ears.  Patient denies breathing difficulty.  Patient not sure if she has a fever.  Patient is staying hydrated.  Patient states that she sees white spots on her throat.  Patient states that she will go to [facility].      Reason for Disposition    Pus on tonsils (back of throat) and swollen neck lymph nodes ('glands')    Additional Information    Negative: SEVERE difficulty breathing (e.g., struggling for each breath, speaks in single words)    Negative: Sounds like a life-threatening emergency to the triager    Negative: Drooling or spitting out saliva (because can't swallow)    Negative: Unable to open mouth completely    Negative: Drinking very little and has signs of dehydration (e.g., no urine > 12 hours, very dry mouth, very lightheaded)    Negative: Patient sounds very sick or weak to the triager    Negative: Difficulty breathing (per caller) but not severe    Negative: Fever > 103 F (39.4 C)    Negative: Refuses to drink anything for > 12 hours    Negative: SEVERE sore throat pain    Protocols used: SORE THROAT-A-OH

## 2023-06-14 NOTE — PROGRESS NOTES
URGENT CARE VISIT:    SUBJECTIVE:   William Payne is a 22 year old female presenting with a chief complaint of sore throat.  Onset was 1 day(s) ago.   She denies the following symptoms: stuffy nose and cough - non-productive  Course of illness is same.    Treatment measures tried include none tried with no relief of symptoms.  Predisposing factors include None.    PMH: History reviewed. No pertinent past medical history.  Allergies: Seasonal allergies and Citrullus vulgaris   Medications:   Current Outpatient Medications   Medication Sig Dispense Refill     adalimumab (HUMIRA *CF*) 40 MG/0.4ML pen kit Inject 0.4 mLs (40 mg) Subcutaneous every 14 days Hold for signs of infection, then seek medical attention. 2.4 mL 3     azelaic acid (FINACIA) 15 % external gel Apply topically 2 times daily       drospirenone-ethinyl estradiol (BRANDI) 3-0.02 MG tablet Take 1 tablet by mouth daily       Social History:   Social History     Tobacco Use     Smoking status: Never     Smokeless tobacco: Never   Vaping Use     Vaping status: Not on file   Substance Use Topics     Alcohol use: Not on file       ROS:  Review of systems negative except as stated above.    OBJECTIVE:  /73 (BP Location: Right arm, Patient Position: Sitting, Cuff Size: Adult Regular)   Pulse 115   Temp 99.9  F (37.7  C) (Oral)   Wt 58.1 kg (128 lb)   SpO2 99%   BMI 21.30 kg/m    GENERAL APPEARANCE: healthy, alert and no distress  EYES: EOMI,  PERRL, conjunctiva clear  HENT: ear canals and TM's normal.  Mildly erythematous oropharynx  NECK: supple, nontender, no lymphadenopathy  RESP: lungs clear to auscultation - no rales, rhonchi or wheezes  CV: regular rates and rhythm, normal S1 S2, no murmur noted  SKIN: no suspicious lesions or rashes    Labs:    Results for orders placed or performed in visit on 06/14/23   Streptococcus A Rapid Screen w/Reflex to PCR     Status: Normal    Specimen: Throat; Swab   Result Value Ref Range    Group A Strep antigen  Negative Negative       ASSESSMENT:    ICD-10-CM    1. Viral pharyngitis  J02.9 Streptococcus A Rapid Screen w/Reflex to PCR     Group A Streptococcus PCR Throat Swab          PLAN:  Patient Instructions   Patient was educated on the natural course of viral throat infection. Conservative measures discussed including warm fluids, salt water gargles, Lozenges (Cepacol), and over-the-counter analgesics (Tylenol or Ibuprofen). See your primary care provider if symptoms worsen or do not improve in 7 days. Seek emergency care if you develop severe throat pain, or difficulty swallowing.     Patient verbalized understanding and is agreeable to plan. The patient was discharged ambulatory and in stable condition.    Sierra Samuels PA-C ....................  6/14/2023   1:26 PM

## 2023-06-15 ENCOUNTER — OFFICE VISIT (OUTPATIENT)
Dept: URGENT CARE | Facility: URGENT CARE | Age: 23
End: 2023-06-15
Payer: COMMERCIAL

## 2023-06-15 VITALS
DIASTOLIC BLOOD PRESSURE: 75 MMHG | BODY MASS INDEX: 21.63 KG/M2 | OXYGEN SATURATION: 100 % | TEMPERATURE: 99.1 F | SYSTOLIC BLOOD PRESSURE: 122 MMHG | HEART RATE: 82 BPM | WEIGHT: 130 LBS

## 2023-06-15 DIAGNOSIS — R07.0 THROAT PAIN: ICD-10-CM

## 2023-06-15 DIAGNOSIS — J02.9 VIRAL PHARYNGITIS: Primary | ICD-10-CM

## 2023-06-15 LAB — DEPRECATED S PYO AG THROAT QL EIA: NEGATIVE

## 2023-06-15 PROCEDURE — 99213 OFFICE O/P EST LOW 20 MIN: CPT | Performed by: PHYSICIAN ASSISTANT

## 2023-06-15 PROCEDURE — 87651 STREP A DNA AMP PROBE: CPT | Performed by: PHYSICIAN ASSISTANT

## 2023-06-15 ASSESSMENT — PAIN SCALES - GENERAL: PAINLEVEL: MILD PAIN (2)

## 2023-06-15 NOTE — PATIENT INSTRUCTIONS
You or your child have pharyngitis (sore throat). This infection is caused by a virus. It can cause throat pain that is worse when swallowing, aching all over, headache, and fever. The infection may be spread by coughing, kissing, or touching others after touching your mouth or nose. Antibiotic medicines do not work against viruses. They are not used for treating this illness.    Relieving your symptoms  Drink plenty of fluids to prevent dehydration.  Don t smoke, and avoid secondhand smoke.  For children, try throat sprays or Popsicles. Adults and older children may try lozenges.  Drink warm liquids to soothe the throat and help thin mucus. Avoid alcohol, spicy foods, salty foods, and acidic drinks such as orange juice. These can irritate the throat.  Gargle with warm saltwater (1 teaspoon of salt to 8 ounces of warm water).  Use a humidifier to keep air moist and relieve throat dryness.  Try over-the-counter pain relievers such as acetaminophen or ibuprofen. Use as directed, and don t exceed the recommended dose. Don t give aspirin to children.   Take Tylenol 650mg every 4 hours or ibuprofen 600mg every 6 hours by mouth for pain/fever.  Do not exceed 4000mg of acetaminophen or 2400mg of ibuprofen from any source in a 24 hour period.  Taking Tylenol and ibuprofen together may be helpful in reducing pain. If you have chronic liver or kidney disease or ever had a stomach ulcer or GI bleeding, talk with your healthcare provider before using these medicines.   Are antibiotics needed?  Most sore throats are caused by cold or flu viruses. And antibiotics don t treat viral illness. In fact, using antibiotics when they re not needed may produce bacteria that are harder to kill. Your healthcare provider will prescribe antibiotics only if he or she thinks they are likely to help.  Is surgery needed?  In some cases, tonsils need to be removed. This is often done as outpatient (same-day) surgery. Your healthcare provider may  advise removing the tonsils in cases of:  Several severe bouts of tonsillitis in a year.  Severe  episodes include those that lead to missed days of school or work, or that need to be treated with antibiotics.  Tonsillitis that causes breathing problems during sleep  Tonsillitis caused by food particles collecting in pouches in the tonsils (cryptic tonsillitis)  Call your healthcare provider if any of the following occur:  Symptoms worsen, or new symptoms develop.  Swollen tonsils make breathing difficult.  Painful lumps in the back of neck  Stiff neck  Lymph nodes are getting larger  Can t swallow liquids, a lot of drooling, or can t open mouth wide due to throat pain  Signs of dehydration, such as very dark urine or no urine, sunken eyes, dizziness  Trouble breathing or noisy breathing  Muffled voice  A skin rash, hives, or wheezing develops. Any of these could signal an allergic reaction to antibiotics.  Symptoms don t improve within a week.   Date Last Reviewed: 10/1/2016    1462-8063 [employer]. [address] [place] [address], [place] [address]. All rights reserved. This information is not intended as a substitute for professional medical care. Always follow your healthcare professional's instructions.

## 2023-06-16 LAB — GROUP A STREP BY PCR: NOT DETECTED

## 2023-06-16 NOTE — PROGRESS NOTES
Assessment/Plan:    Strep negative again today. No sign of retropharyngeal or peritonsillar abscess. Afebrile and in no acute distress. Suspect viral etiology. Continue supportive cares- use of ibuprofen, acetaminophen, Chloraseptic throat spray, throat lozenges as needed. Offered viscous lidocaine Rx, pt declines.     See patient instructions below.    At the end of the encounter, I discussed results, diagnosis, medications. Discussed red flags for immediate return to clinic/ER, as well as indications for follow up if no improvement. Patient understood and agreed to plan. Patient was stable for discharge.      ICD-10-CM    1. Viral pharyngitis  J02.9       2. Throat pain  R07.0 Streptococcus A Rapid Screen w/Reflex to PCR - Clinic Collect     Group A Streptococcus PCR Throat Swab            Return in about 1 week (around 6/22/2023) for Follow up w/ primary care provider if not better.    JEREMIE De Los Santos, DK  [facility]  -----------------------------------------------------------------------------------------------------------------------------------------------------    HPI:  William Payne is a 22 year old female with hx of RA who presents for evaluation of sore throat onset 2 days ago. She also had fever of 101 F last night.  She was seen here in  yesterday, had strep test done which was negative. Pt returns today as symptoms have worsened and she did not feel the strep test done yesterday was adequately performed.  She has a hx of recurrent strep, and was seen by ENT on 6/6, no clear indication for tonsillectomy. She has had 4 episodes of strep over the past 8 months.  No treatments tried. Patient reports no cough, congestion,headache, chest pain, shortness of breath, abdominal pain, nausea, vomiting, diarrhea, rash, or any other symptoms.      She is on Humira as well as a burst of steroid about once/week for her RA.  No past medical history on file.    Vitals:     06/15/23 1704   BP: 122/75   BP Location: Right arm   Patient Position: Sitting   Cuff Size: Adult Regular   Pulse: 82   Temp: 99.1  F (37.3  C)   TempSrc: Oral   SpO2: 100%   Weight: 59 kg (130 lb)       Physical Exam  Vitals and nursing note reviewed.   HENT:      Right Ear: Tympanic membrane normal.      Left Ear: Tympanic membrane normal.      Mouth/Throat:      Mouth: Mucous membranes are moist.      Pharynx: Uvula midline. Posterior oropharyngeal erythema present. No pharyngeal swelling.      Tonsils: No tonsillar exudate or tonsillar abscesses.   Cardiovascular:      Rate and Rhythm: Normal rate and regular rhythm.   Pulmonary:      Effort: Pulmonary effort is normal.      Breath sounds: Normal breath sounds.   Neurological:      Mental Status: She is alert.         Labs/Imaging:  Results for orders placed or performed in visit on 06/15/23 (from the past 24 hour(s))   Streptococcus A Rapid Screen w/Reflex to PCR - Clinic Collect    Specimen: Throat; Swab   Result Value Ref Range    Group A Strep antigen Negative Negative     No results found for this or any previous visit (from the past 24 hour(s)).        Patient Instructions     You or your child have pharyngitis (sore throat). This infection is caused by a virus. It can cause throat pain that is worse when swallowing, aching all over, headache, and fever. The infection may be spread by coughing, kissing, or touching others after touching your mouth or nose. Antibiotic medicines do not work against viruses. They are not used for treating this illness.    Relieving your symptoms    Drink plenty of fluids to prevent dehydration.    Don t smoke, and avoid secondhand smoke.    For children, try throat sprays or Popsicles. Adults and older children may try lozenges.    Drink warm liquids to soothe the throat and help thin mucus. Avoid alcohol, spicy foods, salty foods, and acidic drinks such as orange juice. These can irritate the throat.    Gargle with warm  saltwater (1 teaspoon of salt to 8 ounces of warm water).    Use a humidifier to keep air moist and relieve throat dryness.    Try over-the-counter pain relievers such as acetaminophen or ibuprofen. Use as directed, and don t exceed the recommended dose. Don t give aspirin to children.     Take Tylenol 650mg every 4 hours or ibuprofen 600mg every 6 hours by mouth for pain/fever.  Do not exceed 4000mg of acetaminophen or 2400mg of ibuprofen from any source in a 24 hour period.  Taking Tylenol and ibuprofen together may be helpful in reducing pain. If you have chronic liver or kidney disease or ever had a stomach ulcer or GI bleeding, talk with your healthcare provider before using these medicines.   Are antibiotics needed?  Most sore throats are caused by cold or flu viruses. And antibiotics don t treat viral illness. In fact, using antibiotics when they re not needed may produce bacteria that are harder to kill. Your healthcare provider will prescribe antibiotics only if he or she thinks they are likely to help.  Is surgery needed?  In some cases, tonsils need to be removed. This is often done as outpatient (same-day) surgery. Your healthcare provider may advise removing the tonsils in cases of:    Several severe bouts of tonsillitis in a year.  Severe  episodes include those that lead to missed days of school or work, or that need to be treated with antibiotics.    Tonsillitis that causes breathing problems during sleep    Tonsillitis caused by food particles collecting in pouches in the tonsils (cryptic tonsillitis)  Call your healthcare provider if any of the following occur:    Symptoms worsen, or new symptoms develop.    Swollen tonsils make breathing difficult.    Painful lumps in the back of neck    Stiff neck    Lymph nodes are getting larger    Can t swallow liquids, a lot of drooling, or can t open mouth wide due to throat pain    Signs of dehydration, such as very dark urine or no urine, sunken eyes,  dizziness    Trouble breathing or noisy breathing    Muffled voice    A skin rash, hives, or wheezing develops. Any of these could signal an allergic reaction to antibiotics.    Symptoms don t improve within a week.   Date Last Reviewed: 10/1/2016    9789-7474 The [employer]. [address] [place] [address]. All rights reserved. This information is not intended as a substitute for professional medical care. Always follow your healthcare professional's instructions.

## 2023-07-11 ENCOUNTER — MYC MEDICAL ADVICE (OUTPATIENT)
Dept: RHEUMATOLOGY | Facility: CLINIC | Age: 23
End: 2023-07-11
Payer: COMMERCIAL

## 2023-07-11 DIAGNOSIS — R76.8 CYCLIC CITRULLINATED PEPTIDE (CCP) ANTIBODY POSITIVE: ICD-10-CM

## 2023-07-11 DIAGNOSIS — M05.79 RHEUMATOID ARTHRITIS INVOLVING MULTIPLE SITES WITH POSITIVE RHEUMATOID FACTOR (H): ICD-10-CM

## 2023-07-12 ENCOUNTER — TELEPHONE (OUTPATIENT)
Dept: RHEUMATOLOGY | Facility: CLINIC | Age: 23
End: 2023-07-12
Payer: COMMERCIAL

## 2023-07-12 NOTE — TELEPHONE ENCOUNTER
PA Initiation    Medication: HUMIRA *CF* PEN 40 MG/0.4ML SC PNKT  Insurance Company: [employer] - Phone [phone] Fax [phone]  Pharmacy Filling the Rx:    Filling Pharmacy Phone:    Filling Pharmacy Fax:    Start Date: 7/12/2023    [id number]

## 2023-07-12 NOTE — TELEPHONE ENCOUNTER
Message left for patient that our specialty pharmacy tem is working on this refill, will send MyChart message also.    MEENU WilcoxN, RN  RN Care Coordinator Rheumatology

## 2023-07-24 NOTE — TELEPHONE ENCOUNTER
Prior Authorization Approval    Medication: HUMIRA *CF* PEN 40 MG/0.4ML SC PNKT  Authorization Effective Date: 7/24/2023  Authorization Expiration Date: 7/20/2024  Approved Dose/Quantity: q14d  Reference #: [id number]   Insurance Company: [employer] - Phone [phone] Fax [phone]  Expected CoPay:       CoPay Card Available:      Financial Assistance Needed: no  Which Pharmacy is filling the prescription:    Pharmacy Notified: Yes  Patient Notified: Yes

## 2023-07-25 ENCOUNTER — TELEPHONE (OUTPATIENT)
Dept: RHEUMATOLOGY | Facility: CLINIC | Age: 23
End: 2023-07-25
Payer: COMMERCIAL

## 2023-07-25 DIAGNOSIS — R76.8 CYCLIC CITRULLINATED PEPTIDE (CCP) ANTIBODY POSITIVE: ICD-10-CM

## 2023-07-25 DIAGNOSIS — M05.79 RHEUMATOID ARTHRITIS INVOLVING MULTIPLE SITES WITH POSITIVE RHEUMATOID FACTOR (H): ICD-10-CM

## 2023-07-25 NOTE — TELEPHONE ENCOUNTER
Prior Authorization Approval    Medication: HUMIRA *CF* PEN 40 MG/0.4ML SC PNKT  Authorization Effective Date: 7/25/2023  Authorization Expiration Date: 7/25/2024  Approved Dose/Quantity: q14d  Reference #: [id number]   Insurance Company: [employer] ([facility]) - Phone [phone] Fax [phone]:  [employer] ([facility] [employer])  Expected CoPay:       CoPay Card Available:      Financial Assistance Needed: no  Which Pharmacy is filling the prescription: [facility] - [address]  Pharmacy Notified: Yes  Patient Notified: Yes

## 2023-07-25 NOTE — TELEPHONE ENCOUNTER
PA Initiation    Medication: HUMIRA *CF* PEN 40 MG/0.4ML SC PNKT  Insurance Company: [employer] ([facility]) - Phone [phone] Fax [phone]:  [employer] ([employer])  Pharmacy Filling the Rx: [facility] - [place], [place] - [address]  Filling Pharmacy Phone:    Filling Pharmacy Fax:    Start Date: 7/25/2023    [id number]-per patient this is her primary insurance

## 2023-07-25 NOTE — TELEPHONE ENCOUNTER
New Humira order sent 7/25/23 to [facility] per new insurance requirement.    Nya Michael, PharmD  Medication Therapy Management Pharmacist  [facility] Rheumatology [facility]

## 2023-09-07 ENCOUNTER — OFFICE VISIT (OUTPATIENT)
Dept: PEDIATRICS | Facility: CLINIC | Age: 23
End: 2023-09-07
Payer: COMMERCIAL

## 2023-09-07 VITALS
SYSTOLIC BLOOD PRESSURE: 112 MMHG | OXYGEN SATURATION: 98 % | DIASTOLIC BLOOD PRESSURE: 52 MMHG | TEMPERATURE: 98.2 F | HEIGHT: 65 IN | RESPIRATION RATE: 14 BRPM | BODY MASS INDEX: 22.46 KG/M2 | HEART RATE: 76 BPM | WEIGHT: 134.8 LBS

## 2023-09-07 DIAGNOSIS — L70.0 ACNE VULGARIS: Primary | ICD-10-CM

## 2023-09-07 DIAGNOSIS — Z30.09 GENERAL COUNSELING FOR PRESCRIPTION OF ORAL CONTRACEPTIVES: ICD-10-CM

## 2023-09-07 DIAGNOSIS — Z11.59 NEED FOR HEPATITIS C SCREENING TEST: ICD-10-CM

## 2023-09-07 DIAGNOSIS — M05.79 RHEUMATOID ARTHRITIS INVOLVING MULTIPLE SITES WITH POSITIVE RHEUMATOID FACTOR (H): ICD-10-CM

## 2023-09-07 DIAGNOSIS — Z11.4 SCREENING FOR HIV (HUMAN IMMUNODEFICIENCY VIRUS): ICD-10-CM

## 2023-09-07 PROCEDURE — 86803 HEPATITIS C AB TEST: CPT | Performed by: PHYSICIAN ASSISTANT

## 2023-09-07 PROCEDURE — 99214 OFFICE O/P EST MOD 30 MIN: CPT | Mod: 25 | Performed by: PHYSICIAN ASSISTANT

## 2023-09-07 PROCEDURE — 90471 IMMUNIZATION ADMIN: CPT | Performed by: PHYSICIAN ASSISTANT

## 2023-09-07 PROCEDURE — 90715 TDAP VACCINE 7 YRS/> IM: CPT | Performed by: PHYSICIAN ASSISTANT

## 2023-09-07 PROCEDURE — 36415 COLL VENOUS BLD VENIPUNCTURE: CPT | Performed by: PHYSICIAN ASSISTANT

## 2023-09-07 PROCEDURE — 87389 HIV-1 AG W/HIV-1&-2 AB AG IA: CPT | Performed by: PHYSICIAN ASSISTANT

## 2023-09-07 RX ORDER — AZELAIC ACID 0.15 G/G
GEL TOPICAL 2 TIMES DAILY
Qty: 50 G | Refills: 4 | Status: SHIPPED | OUTPATIENT
Start: 2023-09-07 | End: 2024-02-06

## 2023-09-07 RX ORDER — DROSPIRENONE AND ETHINYL ESTRADIOL 0.02-3(28)
1 KIT ORAL DAILY
Qty: 90 TABLET | Refills: 3 | Status: SHIPPED | OUTPATIENT
Start: 2023-09-07 | End: 2024-03-14

## 2023-09-07 ASSESSMENT — PAIN SCALES - GENERAL: PAINLEVEL: NO PAIN (0)

## 2023-09-07 NOTE — PROGRESS NOTES
"  Assessment & Plan     Acne vulgaris  Working well. Tolerates tx. Refilled.   - azelaic acid (FINACIA) 15 % external gel  Dispense: 50 g; Refill: 4    General counseling for prescription of oral contraceptives  Tolerates well. Refill x one year. Pap is due end of this year.  - drospirenone-ethinyl estradiol (BRANDI) 3-0.02 MG tablet  Dispense: 90 tablet; Refill: 3    Rheumatoid arthritis involving multiple sites with positive rheumatoid factor (H)  Continue follow up with rheumatology.     Need for hepatitis C screening test    - Hepatitis C Screen Reflex to HCV RNA Quant and Genotype    Screening for HIV (human immunodeficiency virus)    - HIV Antigen Antibody Combo                   DK [facility] DANIELA Thomas is a 23 year old, presenting for the following health issues:  Establish Care      9/7/2023    12:52 PM   Additional Questions   Roomed by Tanesha ALCANTAR LPN       History of Present Illness       Reason for visit:  Establishing new primary care provider    She eats 2-3 servings of fruits and vegetables daily.She consumes 0 sweetened beverage(s) daily.She exercises with enough effort to increase her heart rate 60 or more minutes per day.  She exercises with enough effort to increase her heart rate 5 days per week.   She is taking medications regularly.           Establishing care.    Seeing Rheum for RA. Had a flare of her left thumb x 10 days- no prednisone use. Started 8/18/23.  Needs refill of ocp and acne medication. Both work well for her w/o side effects. She had been on Accutane in the past. Due for pap end of 2023- hx of abnormal with colp.       Review of Systems         Objective    /52   Pulse 76   Temp 98.2  F (36.8  C) (Oral)   Resp 14   Ht 1.651 m (5' 5\")   Wt 61.1 kg (134 lb 12.8 oz)   LMP 08/07/2023   SpO2 98%   BMI 22.43 kg/m    Body mass index is 22.43 kg/m .  Physical Exam   GENERAL: healthy, alert and no distress  RESP: lungs clear to " auscultation - no rales, rhonchi or wheezes  CV: regular rate and rhythm, normal S1 S2, no S3 or S4, no murmur, click or rub, no peripheral edema and peripheral pulses strong  SKIN: no suspicious lesions or rashes

## 2023-09-07 NOTE — PATIENT INSTRUCTIONS
Making appointments with me can happen in the following ways:    Call [phone]. Depending on your needs, this can be in person or virtual.  You can also schedule appointments on your own through [facility].   If you are part of [facility], there is also an option for E-Visits when appropriate. Please follow the recommended guidelines for this.  4.   You are also welcome to schedule with my partner, Lindsay Turpin.  She's an NP that works closely with me in helping my access in seeing patients when they can't get in to see me in a timely manner.     Communication with me can happen in the following ways:  Call [phone] with your concerns.  Use [facility]     The [email or web address] update is intended for a one-way communication to update your medical history and not intended to be a back-and-forth or for medical advice for new issues.     A reminder that an evisit is intended for any medical advice , prescription, labs or referrals that are needed.

## 2023-09-08 LAB
HCV AB SERPL QL IA: NONREACTIVE
HIV 1+2 AB+HIV1 P24 AG SERPL QL IA: NONREACTIVE

## 2023-10-07 ENCOUNTER — HEALTH MAINTENANCE LETTER (OUTPATIENT)
Age: 23
End: 2023-10-07

## 2024-02-05 SDOH — HEALTH STABILITY: PHYSICAL HEALTH: ON AVERAGE, HOW MANY MINUTES DO YOU ENGAGE IN EXERCISE AT THIS LEVEL?: 90 MIN

## 2024-02-05 SDOH — HEALTH STABILITY: PHYSICAL HEALTH: ON AVERAGE, HOW MANY DAYS PER WEEK DO YOU ENGAGE IN MODERATE TO STRENUOUS EXERCISE (LIKE A BRISK WALK)?: 6 DAYS

## 2024-02-05 ASSESSMENT — SOCIAL DETERMINANTS OF HEALTH (SDOH): HOW OFTEN DO YOU GET TOGETHER WITH FRIENDS OR RELATIVES?: THREE TIMES A WEEK

## 2024-02-06 ENCOUNTER — OFFICE VISIT (OUTPATIENT)
Dept: PEDIATRICS | Facility: CLINIC | Age: 24
End: 2024-02-06
Payer: COMMERCIAL

## 2024-02-06 VITALS
TEMPERATURE: 97.9 F | WEIGHT: 138 LBS | BODY MASS INDEX: 22.99 KG/M2 | DIASTOLIC BLOOD PRESSURE: 70 MMHG | RESPIRATION RATE: 18 BRPM | OXYGEN SATURATION: 99 % | SYSTOLIC BLOOD PRESSURE: 114 MMHG | HEIGHT: 65 IN | HEART RATE: 80 BPM

## 2024-02-06 DIAGNOSIS — N87.0 DYSPLASIA OF CERVIX, LOW GRADE (CIN 1): ICD-10-CM

## 2024-02-06 DIAGNOSIS — L70.0 ACNE VULGARIS: ICD-10-CM

## 2024-02-06 DIAGNOSIS — Z11.3 SCREEN FOR STD (SEXUALLY TRANSMITTED DISEASE): ICD-10-CM

## 2024-02-06 DIAGNOSIS — M05.79 RHEUMATOID ARTHRITIS INVOLVING MULTIPLE SITES WITH POSITIVE RHEUMATOID FACTOR (H): ICD-10-CM

## 2024-02-06 DIAGNOSIS — Z00.00 ROUTINE GENERAL MEDICAL EXAMINATION AT A HEALTH CARE FACILITY: Primary | ICD-10-CM

## 2024-02-06 LAB
ERYTHROCYTE [DISTWIDTH] IN BLOOD BY AUTOMATED COUNT: 12.3 % (ref 10–15)
HCT VFR BLD AUTO: 39.4 % (ref 35–47)
HGB BLD-MCNC: 13.3 G/DL (ref 11.7–15.7)
MCH RBC QN AUTO: 30.8 PG (ref 26.5–33)
MCHC RBC AUTO-ENTMCNC: 33.8 G/DL (ref 31.5–36.5)
MCV RBC AUTO: 91 FL (ref 78–100)
PLATELET # BLD AUTO: 223 10E3/UL (ref 150–450)
RBC # BLD AUTO: 4.32 10E6/UL (ref 3.8–5.2)
WBC # BLD AUTO: 8 10E3/UL (ref 4–11)

## 2024-02-06 PROCEDURE — G0124 SCREEN C/V THIN LAYER BY MD: HCPCS | Performed by: PATHOLOGY

## 2024-02-06 PROCEDURE — 86200 CCP ANTIBODY: CPT | Performed by: PHYSICIAN ASSISTANT

## 2024-02-06 PROCEDURE — 99395 PREV VISIT EST AGE 18-39: CPT | Performed by: PHYSICIAN ASSISTANT

## 2024-02-06 PROCEDURE — 87591 N.GONORRHOEAE DNA AMP PROB: CPT | Performed by: PHYSICIAN ASSISTANT

## 2024-02-06 PROCEDURE — 87491 CHLMYD TRACH DNA AMP PROBE: CPT | Performed by: PHYSICIAN ASSISTANT

## 2024-02-06 PROCEDURE — 85027 COMPLETE CBC AUTOMATED: CPT | Performed by: PHYSICIAN ASSISTANT

## 2024-02-06 PROCEDURE — 80053 COMPREHEN METABOLIC PANEL: CPT | Performed by: PHYSICIAN ASSISTANT

## 2024-02-06 PROCEDURE — 36415 COLL VENOUS BLD VENIPUNCTURE: CPT | Performed by: PHYSICIAN ASSISTANT

## 2024-02-06 PROCEDURE — G0145 SCR C/V CYTO,THINLAYER,RESCR: HCPCS | Performed by: PHYSICIAN ASSISTANT

## 2024-02-06 PROCEDURE — 87624 HPV HI-RISK TYP POOLED RSLT: CPT | Performed by: PHYSICIAN ASSISTANT

## 2024-02-06 RX ORDER — AZELAIC ACID 0.15 G/G
GEL TOPICAL 2 TIMES DAILY
Qty: 50 G | Refills: 4 | Status: SHIPPED | OUTPATIENT
Start: 2024-02-06 | End: 2024-05-07

## 2024-02-06 ASSESSMENT — PAIN SCALES - GENERAL: PAINLEVEL: NO PAIN (0)

## 2024-02-06 NOTE — PROGRESS NOTES
Preventive Care Visit  [facility] DANIELA Miguel PA-C, Physician Assistant  Feb 6, 2024    Assessment & Plan     Routine general medical examination at a health care facility  Continue to work on heart healthy diet and exercise.     Acne vulgaris  Refilled.   - azelaic acid (FINACIA) 15 % external gel  Dispense: 50 g; Refill: 4    Rheumatoid arthritis involving multiple sites with positive rheumatoid factor (H)  Labs today. Needs to schedule with new rheumatologist as previous is leaving.  - Cyclic Citrullinated Peptide Antibody IgG  - Comprehensive metabolic panel (BMP + Alb, Alk Phos, ALT, AST, Total. Bili, TP)  - CBC with platelets  - Cyclic Citrullinated Peptide Antibody IgG  - Comprehensive metabolic panel (BMP + Alb, Alk Phos, ALT, AST, Total. Bili, TP)  - CBC with platelets    Dysplasia of cervix, low grade (JANIE 1)  Recheck today. JANIE I colp 1/2023  - Pap screen with HPV - recommended age 30 - 65 years    Screen for STD (sexually transmitted disease)    - NEISSERIA GONORRHOEA PCR  - CHLAMYDIA TRACHOMATIS PCR  - NEISSERIA GONORRHOEA PCR  - CHLAMYDIA TRACHOMATIS PCR              Counseling  Appropriate preventive services were discussed with this patient, including applicable screening as appropriate for fall prevention, nutrition, physical activity, Tobacco-use cessation, weight loss and cognition.  Checklist reviewing preventive services available has been given to the patient.  Reviewed patient's diet, addressing concerns and/or questions.             Maddie Thomas is a 23 year old, presenting for the following:  Physical        2/6/2024     1:41 PM   Additional Questions   Roomed by Lucia Marie VF        Needs refill on azelaic acid.    No additional concerns.         Health Care Directive  Patient does not have a Health Care Directive or Living Will: Discussed advance care planning with patient; however, patient declined at this time.    HPI  No current concerns             2/5/2024   General Health   How would you rate your overall physical health? Good   Feel stress (tense, anxious, or unable to sleep) Not at all         2/5/2024   Nutrition   Three or more servings of calcium each day? Yes   Diet: Regular (no restrictions)   How many servings of fruit and vegetables per day? (!) 2-3   How many sweetened beverages each day? 0-1         2/5/2024   Exercise   Days per week of moderate/strenous exercise 6 days   Average minutes spent exercising at this level 90 min         2/5/2024   Social Factors   Frequency of gathering with friends or relatives Three times a week   Worry food won't last until get money to buy more No   Food not last or not have enough money for food? No   Do you have housing?  Yes   Are you worried about losing your housing? No   Lack of transportation? No   Unable to get utilities (heat,electricity)? No         2/5/2024   Dental   Dentist two times every year? Yes          No data to display                  Today's PHQ-2 Score:       2/5/2024     2:48 PM   PHQ-2 ( 1999 [employer])   Q1: Little interest or pleasure in doing things 0   Q2: Feeling down, depressed or hopeless 0   PHQ-2 Score 0   Q1: Little interest or pleasure in doing things Not at all   Q2: Feeling down, depressed or hopeless Not at all   PHQ-2 Score 0           2/5/2024   Substance Use   Alcohol more than 3/day or more than 7/wk No   Do you use any other substances recreationally? No     Social History     Tobacco Use    Smoking status: Never    Smokeless tobacco: Never   Vaping Use    Vaping Use: Never used   Substance Use Topics    Alcohol use: Yes     Comment: 2 -3 per month    Drug use: Never             2/6/2024   Breast Cancer Screening   Family history of breast, colon, or ovarian cancer? No / Unknown              2/5/2024   STI Screening   New sexual partner(s) since last STI/HIV test? (!) YES      History of abnormal Pap smear:      JANIE 1 colp 1/2023 2/5/2024   Contraception/Family  "Planning   Questions about contraception or family planning No        Reviewed and updated as needed this visit by Provider                      Review of Systems       Objective    Exam  /70 (BP Location: Right arm, Patient Position: Sitting, Cuff Size: Adult Regular)   Pulse 80   Temp 97.9  F (36.6  C) (Oral)   Resp 18   Ht 1.651 m (5' 5\")   Wt 62.6 kg (138 lb)   LMP 01/23/2024 (Approximate)   SpO2 99%   BMI 22.96 kg/m     Estimated body mass index is 22.96 kg/m  as calculated from the following:    Height as of this encounter: 1.651 m (5' 5\").    Weight as of this encounter: 62.6 kg (138 lb).    Physical Exam  GENERAL: alert and no distress  EYES: Eyes grossly normal to inspection, PERRL and conjunctivae and sclerae normal  HENT: ear canals and TM's normal, nose and mouth without ulcers or lesions  NECK: no adenopathy, no asymmetry, masses, or scars  RESP: lungs clear to auscultation - no rales, rhonchi or wheezes  CV: regular rate and rhythm, normal S1 S2, no S3 or S4, no murmur, click or rub, no peripheral edema  ABDOMEN: soft, nontender, no hepatosplenomegaly, no masses and bowel sounds normal   (female): normal female external genitalia, normal urethral meatus, normal vaginal mucosa  MS: no gross musculoskeletal defects noted, no edema  SKIN: no suspicious lesions or rashes  NEURO: Normal strength and tone, mentation intact and speech normal  PSYCH: mentation appears normal, affect normal/bright      Signed Electronically by: Heather Miguel PA-C    "

## 2024-02-06 NOTE — PATIENT INSTRUCTIONS
"Eating Healthy Foods: Care Instructions  With every meal, you can make healthy food choices. Try to eat a variety of fruits, vegetables, whole grains, lean proteins, and low-fat dairy products. This can help you get the right balance of nutrients, including vitamins and minerals. Small changes add up over time. You can start by adding one healthy food to your meals each day.    Try to make half your plate fruits and vegetables, one-fourth whole grains, and one-fourth lean proteins. Try including dairy with your meals.   Eat more fruits and vegetables. Try to have them with most meals and snacks.   Foods for healthy eating    Fruits    These can be fresh, frozen, canned, or dried.  Try to choose whole fruit rather than fruit juice.  Eat a variety of colors.    Vegetables    These can be fresh, frozen, canned, or dried.  Beans, peas, and lentils count too.    Whole grains    Choose whole-grain breads, cereals, and noodles.  Try brown rice.    Lean proteins    These can include lean meat, poultry, fish, and eggs.  You can also have tofu, beans, peas, lentils, nuts, and seeds.    Dairy    Try milk, yogurt, and cheese.  Choose low-fat or fat-free when you can.  If you need to, use lactose-free milk or fortified plant-based milk products, such as soy milk.    Water    Drink water when you're thirsty.  Limit sugar-sweetened drinks, including soda, fruit drinks, and sports drinks.  Where can you learn more?  Go to https://www.2C2P.net/patiented  Enter T756 in the search box to learn more about \"Eating Healthy Foods: Care Instructions.\"  Current as of: February 28, 2023               Content Version: 13.8    6435-9171 [employer].   Care instructions adapted under license by your healthcare professional. If you have questions about a medical condition or this instruction, always ask your healthcare professional. [employer] disclaims any warranty or liability for your use of this " information.      Safer Sex: Care Instructions  Overview  Safer sex is a way to reduce your risk of getting a sexually transmitted infection (STI). It can also help prevent pregnancy.  Several products can help you practice safer sex and reduce your chance of STIs. One of the best is a condom. There are internal and external condoms. You can use a special rubber sheet (dental dam) for protection during oral sex. Disposable gloves can keep your hands from touching blood, semen, or other body fluids that can carry infections.  Remember that birth control methods such as diaphragms, IUDs, foams, and birth control pills do not stop you from getting STIs.  Follow-up care is a key part of your treatment and safety. Be sure to make and go to all appointments, and call your doctor if you are having problems. It's also a good idea to know your test results and keep a list of the medicines you take.  How can you care for yourself at home?  Think about getting vaccinated to help prevent hepatitis A, hepatitis B, and human papillomavirus (HPV). They can be spread through sex.  Use a condom every time you have sex. Use an external condom, which goes on the penis. Or use an internal condom, which goes into the vagina or anus.  Make sure you use the right size external condom. A condom that's too small can break easily. A condom that's too big can slip off during sex.  Use a new condom each time you have sex. Be careful not to poke a hole in the condom when you open the wrapper.  Don't use an internal condom and an external condom at the same time.  Never use petroleum jelly (such as Vaseline), grease, hand lotion, baby oil, or anything with oil in it. These products can make holes in the condom.  After intercourse, hold the edge of the condom as you remove it. This will help keep semen from spilling out of the condom.  Do not have sex with anyone who has symptoms of an STI, such as sores on the genitals or mouth.  Do not drink a  "lot of alcohol or use drugs before sex.  Limit your sex partners. Sex with one partner who has sex only with you can reduce your risk of getting an STI.  Don't share sex toys. But if you do share them, use a condom and clean the sex toys between each use.  Talk to your partner(s) before you have sex. Talk about what you feel comfortable with and whether you have any boundaries with sex. And find out if your partner(s) may be at risk for any STI. Keep in mind that a person may be able to spread an STI even if they do not have symptoms. You and your partner(s) may want to get tested for STIs.  Where can you learn more?  Go to https://www.[employer].net/patiented  Enter B608 in the search box to learn more about \"Safer Sex: Care Instructions.\"  Current as of: April 19, 2023               Content Version: 13.8    3542-4080 [employer].   Care instructions adapted under license by your healthcare professional. If you have questions about a medical condition or this instruction, always ask your healthcare professional. [employer] disclaims any warranty or liability for your use of this information.      Preventive Care Advice   This is general advice given by our system to help you stay healthy. However, your care team may have specific advice just for you. Please talk to your care team about your preventive care needs.  Nutrition  Eat 5 or more servings of fruits and vegetables each day.  Try wheat bread, brown rice and whole grain pasta (instead of white bread, rice, and pasta).  Get enough calcium and vitamin D. Check the label on foods and aim for 100% of the RDA (recommended daily allowance).  Lifestyle  Exercise at least 150 minutes each week  (30 minutes a day, 5 days a week).  Do muscle strengthening activities 2 days a week. These help control your weight and prevent disease.  No smoking.  Wear sunscreen to prevent skin cancer.  Have a dental exam and cleaning every 6 months.  Yearly " exams  See your health care team every year to talk about:  Any changes in your health.  Any medicines your care team has prescribed.  Preventive care, family planning, and ways to prevent chronic diseases.  Shots (vaccines)   HPV shots (up to age 26), if you've never had them before.  Hepatitis B shots (up to age 59), if you've never had them before.  COVID-19 shot: Get this shot when it's due.  Flu shot: Get a flu shot every year.  Tetanus shot: Get a tetanus shot every 10 years.  Pneumococcal, hepatitis A, and RSV shots: Ask your care team if you need these based on your risk.  Shingles shot (for age 50 and up)  General health tests  Diabetes screening:  Starting at age 35, Get screened for diabetes at least every 3 years.  If you are younger than age 35, ask your care team if you should be screened for diabetes.  Cholesterol test: At age 39, start having a cholesterol test every 5 years, or more often if advised.  Bone density scan (DEXA): At age 50, ask your care team if you should have this scan for osteoporosis (brittle bones).  Hepatitis C: Get tested at least once in your life.  STIs (sexually transmitted infections)  Before age 24: Ask your care team if you should be screened for STIs.  After age 24: Get screened for STIs if you're at risk. You are at risk for STIs (including HIV) if:  You are sexually active with more than one person.  You don't use condoms every time.  You or a partner was diagnosed with a sexually transmitted infection.  If you are at risk for HIV, ask about PrEP medicine to prevent HIV.  Get tested for HIV at least once in your life, whether you are at risk for HIV or not.  Cancer screening tests  Cervical cancer screening: If you have a cervix, begin getting regular cervical cancer screening tests starting at age 21.  Breast cancer scan (mammogram): If you've ever had breasts, begin having regular mammograms starting at age 40. This is a scan to check for breast cancer.  Colon cancer  screening: It is important to start screening for colon cancer at age 45.  Have a colonoscopy test every 10 years (or more often if you're at risk) Or, ask your provider about stool tests like a FIT test every year or Cologuard test every 3 years.  To learn more about your testing options, visit:   https://www.[employer]/900220.pdf.  For help making a decision, visit:   https://bit.[employer]use[employer]/on17485.  Prostate cancer screening test: If you have a prostate, ask your care team if a prostate cancer screening test (PSA) at age 55 is right for you.  Lung cancer screening: If you are a current or former smoker ages 50 to 80, ask your care team if ongoing lung cancer screenings are right for you.  For informational purposes only. Not to replace the advice of your health care provider. Copyright   2023 [employer]. All rights reserved. Clinically reviewed by the [facility]. [employer] 609119 - REV 01/24.

## 2024-02-07 ENCOUNTER — OFFICE VISIT (OUTPATIENT)
Dept: URGENT CARE | Facility: URGENT CARE | Age: 24
End: 2024-02-07
Payer: COMMERCIAL

## 2024-02-07 VITALS
TEMPERATURE: 98 F | HEART RATE: 74 BPM | DIASTOLIC BLOOD PRESSURE: 85 MMHG | SYSTOLIC BLOOD PRESSURE: 129 MMHG | OXYGEN SATURATION: 98 % | RESPIRATION RATE: 20 BRPM

## 2024-02-07 DIAGNOSIS — M05.79 RHEUMATOID ARTHRITIS INVOLVING MULTIPLE SITES WITH POSITIVE RHEUMATOID FACTOR (H): Primary | ICD-10-CM

## 2024-02-07 LAB
ALBUMIN SERPL BCG-MCNC: 4.4 G/DL (ref 3.5–5.2)
ALP SERPL-CCNC: 37 U/L (ref 40–150)
ALT SERPL W P-5'-P-CCNC: 22 U/L (ref 0–50)
ANION GAP SERPL CALCULATED.3IONS-SCNC: 9 MMOL/L (ref 7–15)
AST SERPL W P-5'-P-CCNC: 33 U/L (ref 0–45)
BILIRUB SERPL-MCNC: 0.4 MG/DL
BUN SERPL-MCNC: 18.6 MG/DL (ref 6–20)
C TRACH DNA SPEC QL NAA+PROBE: NEGATIVE
CALCIUM SERPL-MCNC: 9.4 MG/DL (ref 8.6–10)
CCP AB SER IA-ACNC: 194 U/ML
CHLORIDE SERPL-SCNC: 103 MMOL/L (ref 98–107)
CREAT SERPL-MCNC: 0.96 MG/DL (ref 0.51–0.95)
DEPRECATED HCO3 PLAS-SCNC: 27 MMOL/L (ref 22–29)
EGFRCR SERPLBLD CKD-EPI 2021: 84 ML/MIN/1.73M2
GLUCOSE SERPL-MCNC: 90 MG/DL (ref 70–99)
N GONORRHOEA DNA SPEC QL NAA+PROBE: NEGATIVE
POTASSIUM SERPL-SCNC: 3.7 MMOL/L (ref 3.4–5.3)
PROT SERPL-MCNC: 7.4 G/DL (ref 6.4–8.3)
SODIUM SERPL-SCNC: 139 MMOL/L (ref 135–145)

## 2024-02-07 PROCEDURE — 96372 THER/PROPH/DIAG INJ SC/IM: CPT | Performed by: FAMILY MEDICINE

## 2024-02-07 PROCEDURE — 99214 OFFICE O/P EST MOD 30 MIN: CPT | Mod: 25 | Performed by: FAMILY MEDICINE

## 2024-02-07 RX ORDER — METHYLPREDNISOLONE SOD SUCC 125 MG
125 VIAL (EA) INJECTION ONCE
Status: COMPLETED | OUTPATIENT
Start: 2024-02-07 | End: 2024-02-07

## 2024-02-07 RX ORDER — PREDNISONE 5 MG/1
TABLET ORAL
Qty: 60 TABLET | Refills: 0 | Status: SHIPPED | OUTPATIENT
Start: 2024-02-07 | End: 2024-02-27

## 2024-02-07 RX ORDER — PREDNISONE 20 MG/1
TABLET ORAL
Qty: 20 TABLET | Refills: 0 | Status: CANCELLED | OUTPATIENT
Start: 2024-02-07

## 2024-02-07 RX ADMIN — Medication 125 MG: at 18:47

## 2024-02-08 NOTE — PROGRESS NOTES
SUBJECTIVE:  Chief Complaint   Patient presents with    Urgent Care     Arthritis flair up      William Payne is a 23 year old female who presents with a chief complaint of rheumatoid arthritis flare.    Has been followed by Rheumatology for her RA since she was a teenager, is trying to establish with new one as her current specialist has retired.    Last time has a flare was 2 years ago  Usually will improve with ibuprofen but has not resolve this time.  Is interested in getting solumedrol treatment to see if this will work for.    Has had to be on prednisone taper before, usually starts at 25 mg and tapers down.  Will get side effects from this and usually does not like to take it but recognizes that may need to.    Past Medical History:   Diagnosis Date    RA (rheumatoid arthritis) (H)      Current Outpatient Medications   Medication Sig Dispense Refill    adalimumab (HUMIRA *CF*) 40 MG/0.4ML pen kit Inject 0.4 mLs (40 mg) Subcutaneous every 14 days Hold for signs of infection, then seek medical attention. 2.4 mL 3    azelaic acid (FINACIA) 15 % external gel Apply topically 2 times daily 50 g 4    drospirenone-ethinyl estradiol (BRANDI) 3-0.02 MG tablet Take 1 tablet by mouth daily 90 tablet 3     Social History     Tobacco Use    Smoking status: Never    Smokeless tobacco: Never   Substance Use Topics    Alcohol use: Yes     Comment: 2 -3 per month       ROS:  Review of systems negative except as stated above.    EXAM:   /85   Pulse 74   Temp 98  F (36.7  C)   Resp 20   LMP 01/23/2024 (Approximate)   SpO2 98%   GENERAL APPEARANCE: healthy, alert and no distress  PSYCH:alert, affect bright      ASSESSMENT/PLAN:  (M05.79) Rheumatoid arthritis involving multiple sites with positive rheumatoid factor (H)  (primary encounter diagnosis)  Comment: acute flare  Plan: predniSONE (DELTASONE) 5 MG tablet,         methylPREDNISolone sodium succinate         (solu-MEDROL) injection 125 mg            Solumedrol  125 mg IM given in clinic.  Developed flushing and lightheadedness after injection, vitals stable.  Patient lay down, ice to nape of neck, juice given.  Symptoms did improve.    RX prednisone taper given to take if still struggling with RA flare in the next 2-3 days.    Follow up with primary provider or rheumatologist if any further concerns    Clifton Moreno MD  February 7, 2024 7:20 PM

## 2024-02-09 LAB
BKR LAB AP GYN ADEQUACY: ABNORMAL
BKR LAB AP GYN INTERPRETATION: ABNORMAL
BKR LAB AP HPV REFLEX: ABNORMAL
BKR LAB AP LMP: ABNORMAL
BKR LAB AP PREVIOUS ABNL DX: ABNORMAL
BKR LAB AP PREVIOUS ABNORMAL: ABNORMAL
PATH REPORT.COMMENTS IMP SPEC: ABNORMAL
PATH REPORT.COMMENTS IMP SPEC: ABNORMAL
PATH REPORT.RELEVANT HX SPEC: ABNORMAL

## 2024-02-12 LAB
HUMAN PAPILLOMA VIRUS 16 DNA: NEGATIVE
HUMAN PAPILLOMA VIRUS 18 DNA: NEGATIVE
HUMAN PAPILLOMA VIRUS FINAL DIAGNOSIS: ABNORMAL
HUMAN PAPILLOMA VIRUS OTHER HR: POSITIVE

## 2024-02-13 ENCOUNTER — PATIENT OUTREACH (OUTPATIENT)
Dept: PEDIATRICS | Facility: CLINIC | Age: 24
End: 2024-02-13
Payer: COMMERCIAL

## 2024-02-13 DIAGNOSIS — R87.612 PAPANICOLAOU SMEAR OF CERVIX WITH LOW GRADE SQUAMOUS INTRAEPITHELIAL LESION (LGSIL): Primary | ICD-10-CM

## 2024-02-13 PROBLEM — R87.810 CERVICAL HIGH RISK HUMAN PAPILLOMAVIRUS (HPV) DNA TEST POSITIVE: Status: ACTIVE | Noted: 2022-12-28

## 2024-02-13 PROBLEM — R87.810 CERVICAL HIGH RISK HPV (HUMAN PAPILLOMAVIRUS) TEST POSITIVE: Status: ACTIVE | Noted: 2024-02-13

## 2024-02-13 NOTE — RESULT ENCOUNTER NOTE
2/6/24 LSIL pap, + HR HPV (neg 16/18). [place] due by 5/6/2024    Message left to return call to [phone].  Results and recommendation released to [employer].     Melissa Atkins RN  Pap Tracking

## 2024-02-14 NOTE — TELEPHONE ENCOUNTER
Below copied and pasted from message sent by provider:    Please just ensure that she eats a good meal and is well hydrated prior to her visit. She should also come with a  if possible.     Thanks,   Beena Chisholm MD

## 2024-02-14 NOTE — TELEPHONE ENCOUNTER
Message left to return call to [phone].  MyChart result note sent.    Melissa Atkins RN  Pap Tracking

## 2024-03-10 ENCOUNTER — MYC REFILL (OUTPATIENT)
Dept: RHEUMATOLOGY | Facility: CLINIC | Age: 24
End: 2024-03-10
Payer: COMMERCIAL

## 2024-03-10 DIAGNOSIS — M05.79 RHEUMATOID ARTHRITIS INVOLVING MULTIPLE SITES WITH POSITIVE RHEUMATOID FACTOR (H): ICD-10-CM

## 2024-03-10 DIAGNOSIS — R76.8 CYCLIC CITRULLINATED PEPTIDE (CCP) ANTIBODY POSITIVE: ICD-10-CM

## 2024-03-13 ENCOUNTER — MYC MEDICAL ADVICE (OUTPATIENT)
Dept: PEDIATRICS | Facility: CLINIC | Age: 24
End: 2024-03-13
Payer: COMMERCIAL

## 2024-03-13 DIAGNOSIS — Z30.09 GENERAL COUNSELING FOR PRESCRIPTION OF ORAL CONTRACEPTIVES: ICD-10-CM

## 2024-03-14 RX ORDER — DROSPIRENONE AND ETHINYL ESTRADIOL 0.02-3(28)
1 KIT ORAL DAILY
Qty: 112 TABLET | OUTPATIENT
Start: 2024-03-14

## 2024-03-14 RX ORDER — DROSPIRENONE AND ETHINYL ESTRADIOL 0.02-3(28)
1 KIT ORAL DAILY
Qty: 90 TABLET | Refills: 1 | Status: SHIPPED | OUTPATIENT
Start: 2024-03-14 | End: 2024-09-03

## 2024-03-14 NOTE — TELEPHONE ENCOUNTER
RN called and spoke with pharmacy    Pharmacy states they do not have prescription on file. Resending prescriptions with adjusted refills for year.       Cheryl NICKERSON RN on 3/14/2024 at 10:51 AM

## 2024-04-25 ENCOUNTER — OFFICE VISIT (OUTPATIENT)
Dept: OBGYN | Facility: CLINIC | Age: 24
End: 2024-04-25
Payer: COMMERCIAL

## 2024-04-25 VITALS — BODY MASS INDEX: 23.53 KG/M2 | DIASTOLIC BLOOD PRESSURE: 80 MMHG | WEIGHT: 141.4 LBS | SYSTOLIC BLOOD PRESSURE: 128 MMHG

## 2024-04-25 DIAGNOSIS — R87.612 PAPANICOLAOU SMEAR OF CERVIX WITH LOW GRADE SQUAMOUS INTRAEPITHELIAL LESION (LGSIL): ICD-10-CM

## 2024-04-25 DIAGNOSIS — R87.810 CERVICAL HIGH RISK HPV (HUMAN PAPILLOMAVIRUS) TEST POSITIVE: Primary | ICD-10-CM

## 2024-04-25 DIAGNOSIS — Z01.812 PRE-PROCEDURE LAB EXAM: ICD-10-CM

## 2024-04-25 LAB
HCG UR QL: NEGATIVE
INTERNAL QC OK POCT: NORMAL
POCT KIT EXPIRATION DATE: NORMAL
POCT KIT LOT NUMBER: NORMAL

## 2024-04-25 PROCEDURE — 88305 TISSUE EXAM BY PATHOLOGIST: CPT | Performed by: PATHOLOGY

## 2024-04-25 PROCEDURE — 81025 URINE PREGNANCY TEST: CPT | Performed by: OBSTETRICS & GYNECOLOGY

## 2024-04-25 PROCEDURE — 57454 BX/CURETT OF CERVIX W/SCOPE: CPT | Performed by: OBSTETRICS & GYNECOLOGY

## 2024-04-25 NOTE — PROGRESS NOTES
INDICATIONS:                                                    William Payne is a 23 year old female with recent abnormal cervical cancer screening history detailed below. Here today for colposcopy. Discussed indication, risks of infection and bleeding.    Is a pregnancy test required: Yes.  Was it positive or negative?  Negative  Was a consent obtained?  Yes    Cervical cancer screening history:  ~Pt at risk for immunosuppression. Takes Humira for RA  7/21/2020 - LSIL pap  6/2021 - NILM pap  12/2022 - LSIL pap, + HR HPV (neg 16/18)  1/31/23 [place] [facility] - JANIE 1.   2/6/24 LSIL pap, + HR HPV (neg 16/18). [place] due by 5/6/2024     Before the procedure, it was ensured that the patient was educated regarding the nature of her findings to date, the implications of them, and what was to be done. She has been made aware of the role of HPV, the natural history of infection, ways to minimize her future risk, the effect of HPV on the cervix, and treatment options available should they be indicated. The details of the colposcopic procedure were reviewed, as well as the risks of pain, infection and bleeding. All questions were answered before proceeding, and informed consent was therefore obtained.    PROCEDURE:                                                    /80   Wt 64.1 kg (141 lb 6.4 oz)   LMP 04/14/2024 (Exact Date)   BMI 23.53 kg/m     Cervix is viewed unenhanced and there is an area of whitening noted at 12 o'clock. Cervix is then stained with acetic acid and viewed colposcopically. Squamocolumnar junction is visualized in it's entirety. acetowhite lesion(s) noted at 12 and 2 o'clock . Lugol's applied with similar absent uptake. Biopsy done Yes. Endocervical curretage Done     POST PROCEDURE:                                                      IMPRESSION: Patient tolerated procedure well, colposcopy adequate, and HPV    PLAN : Await the results of the biopsies.  Repeat pap in 12 months.  She  tolerated  the procedure well. There were no complications. Patient was discharged in stable condition.    Patient advised to call the clinic if excessive bleeding, pelvic pain, or fever.     Follow-up plan based on pathology results. If JANIE-1 or lesser abnormality, repeat co-test in 1 year. If greater abnormality, plan visit to discuss management.    Beena Chisholm MD

## 2024-04-25 NOTE — NURSING NOTE
"Chief Complaint   Patient presents with    Colposcopy     Pap 24- CIN1 with positive HR HPV other. Patient is worried about passing out and vomiting again as that happened during her last colposcopy       Initial /80   Wt 64.1 kg (141 lb 6.4 oz)   LMP 2024 (Exact Date)   BMI 23.53 kg/m   Estimated body mass index is 23.53 kg/m  as calculated from the following:    Height as of 24: 1.651 m (5' 5\").    Weight as of this encounter: 64.1 kg (141 lb 6.4 oz).  BP completed using cuff size: regular    Questioned patient about current smoking habits.  Pt. has never smoked.        The following HM Due: NONE    Stefano Kinsey CMA               "

## 2024-04-25 NOTE — RESULT ENCOUNTER NOTE
Results discussed directly with patient in clinic. Further details documented in the note.     Beena Chisholm MD

## 2024-04-29 LAB
PATH REPORT.COMMENTS IMP SPEC: NORMAL
PATH REPORT.COMMENTS IMP SPEC: NORMAL
PATH REPORT.FINAL DX SPEC: NORMAL
PATH REPORT.GROSS SPEC: NORMAL
PATH REPORT.MICROSCOPIC SPEC OTHER STN: NORMAL
PATH REPORT.RELEVANT HX SPEC: NORMAL
PHOTO IMAGE: NORMAL

## 2024-05-07 ENCOUNTER — MYC REFILL (OUTPATIENT)
Dept: PEDIATRICS | Facility: CLINIC | Age: 24
End: 2024-05-07
Payer: COMMERCIAL

## 2024-05-07 DIAGNOSIS — L70.0 ACNE VULGARIS: ICD-10-CM

## 2024-05-07 RX ORDER — AZELAIC ACID 0.15 G/G
GEL TOPICAL 2 TIMES DAILY
Qty: 50 G | Refills: 4 | Status: SHIPPED | OUTPATIENT
Start: 2024-05-07

## 2024-05-16 ENCOUNTER — PATIENT OUTREACH (OUTPATIENT)
Dept: OBGYN | Facility: CLINIC | Age: 24
End: 2024-05-16
Payer: COMMERCIAL

## 2024-05-16 PROBLEM — N87.0 MILD DYSPLASIA OF CERVIX: Status: ACTIVE | Noted: 2022-12-28

## 2024-06-03 DIAGNOSIS — R76.8 CYCLIC CITRULLINATED PEPTIDE (CCP) ANTIBODY POSITIVE: ICD-10-CM

## 2024-06-03 DIAGNOSIS — M05.79 RHEUMATOID ARTHRITIS INVOLVING MULTIPLE SITES WITH POSITIVE RHEUMATOID FACTOR (H): ICD-10-CM

## 2024-06-10 ENCOUNTER — MYC MEDICAL ADVICE (OUTPATIENT)
Dept: PEDIATRICS | Facility: CLINIC | Age: 24
End: 2024-06-10
Payer: COMMERCIAL

## 2024-06-12 ENCOUNTER — OFFICE VISIT (OUTPATIENT)
Dept: RHEUMATOLOGY | Facility: CLINIC | Age: 24
End: 2024-06-12
Attending: STUDENT IN AN ORGANIZED HEALTH CARE EDUCATION/TRAINING PROGRAM
Payer: COMMERCIAL

## 2024-06-12 ENCOUNTER — OFFICE VISIT (OUTPATIENT)
Dept: INTERNAL MEDICINE | Facility: CLINIC | Age: 24
End: 2024-06-12
Payer: COMMERCIAL

## 2024-06-12 VITALS
BODY MASS INDEX: 23.68 KG/M2 | WEIGHT: 142.1 LBS | RESPIRATION RATE: 20 BRPM | SYSTOLIC BLOOD PRESSURE: 118 MMHG | TEMPERATURE: 97.6 F | OXYGEN SATURATION: 98 % | HEIGHT: 65 IN | DIASTOLIC BLOOD PRESSURE: 74 MMHG | HEART RATE: 92 BPM

## 2024-06-12 VITALS
OXYGEN SATURATION: 98 % | HEART RATE: 79 BPM | WEIGHT: 141 LBS | SYSTOLIC BLOOD PRESSURE: 127 MMHG | DIASTOLIC BLOOD PRESSURE: 79 MMHG | BODY MASS INDEX: 23.46 KG/M2

## 2024-06-12 DIAGNOSIS — M05.79 RHEUMATOID ARTHRITIS INVOLVING MULTIPLE SITES WITH POSITIVE RHEUMATOID FACTOR (H): Primary | ICD-10-CM

## 2024-06-12 DIAGNOSIS — D84.1 HYPOCOMPLEMENTEMIA (H): ICD-10-CM

## 2024-06-12 DIAGNOSIS — H92.03 OTALGIA OF BOTH EARS: Primary | ICD-10-CM

## 2024-06-12 DIAGNOSIS — R76.8 CYCLIC CITRULLINATED PEPTIDE (CCP) ANTIBODY POSITIVE: ICD-10-CM

## 2024-06-12 DIAGNOSIS — Z79.899 HIGH RISK MEDICATION USE: ICD-10-CM

## 2024-06-12 DIAGNOSIS — Z11.59 NEED FOR HEPATITIS B SCREENING TEST: ICD-10-CM

## 2024-06-12 PROCEDURE — 99213 OFFICE O/P EST LOW 20 MIN: CPT

## 2024-06-12 PROCEDURE — 99213 OFFICE O/P EST LOW 20 MIN: CPT | Performed by: STUDENT IN AN ORGANIZED HEALTH CARE EDUCATION/TRAINING PROGRAM

## 2024-06-12 PROCEDURE — 99215 OFFICE O/P EST HI 40 MIN: CPT | Performed by: STUDENT IN AN ORGANIZED HEALTH CARE EDUCATION/TRAINING PROGRAM

## 2024-06-12 RX ORDER — PREDNISONE 20 MG/1
20 TABLET ORAL DAILY
Qty: 3 TABLET | Refills: 0 | Status: SHIPPED | OUTPATIENT
Start: 2024-06-12 | End: 2024-06-15

## 2024-06-12 ASSESSMENT — ENCOUNTER SYMPTOMS
SHORTNESS OF BREATH: 0
COUGH: 0
WEIGHT LOSS: 0
FEVER: 0

## 2024-06-12 ASSESSMENT — PAIN SCALES - GENERAL
PAINLEVEL: NO PAIN (0)
PAINLEVEL: NO PAIN (0)

## 2024-06-12 NOTE — NURSING NOTE
Chief Complaint   Patient presents with    Consult     /79 (BP Location: Left arm, Patient Position: Sitting, Cuff Size: Adult Regular)   Pulse 79   Wt 64 kg (141 lb)   LMP 05/15/2024 (Approximate)   SpO2 98%   BMI 23.46 kg/m

## 2024-06-12 NOTE — PROGRESS NOTES
RHEUMATOLOGY OUTPATIENT CLINIC NOTE     Referring Provider: Alex Johnson MD     Rheumatology history:  She has been evaluated by multiple rheumatologists throughout the years.  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    Family history of psoriatic arthritis in mother, maternal grandmother and aunt with rheumatoid arthritis    Treatment included: Plaquenil, methotrexate, Humira    Lab review     RF:  Negative    CCP Ab:  Positive , 194, 217 positive    HLA B27; negative     FIOR: Positive     dsDNA:  Negative    RANDY panel:  Negative    SSA: Negative    SSB: Negative         Imaging review     X-ray right hand 11/2022: No erosions   X-ray sacroiliac joints 2014: No evidence of sacroiliitis     MRI Left wrist 2014: No acute internal derangement. No significant abnormal enhancement or bony erosion       Subjective     Visit date June 12, 2024    William is a 23 year old female who presents today for follow up.  Her last appointment was around April 2023.    Since the last visit,    - She had flare in February involving hands, sternoclavicular, shoulders, treated with IM injection Solumedrol       Today, William mentioned the following:    She feels her arthritis is under control.   She remains intermittent joint pain (shoulders, sternoclavicular, hands, hips) treated with ibuprofen once and symptoms improve. These episodes are not frequent and she does not feel rheumatoid arthritis is limiting her activities.     She denies raynaud's, sicca symptoms, skin rash    Review of Systems   Constitutional:  Negative for fever and weight loss.   Respiratory:  Negative for cough and shortness of breath.    Musculoskeletal:  Negative for joint pain.        Joint swelling: None visible  Morning stiffness none   Skin:  Negative for rash.     Current Medications   Humira injection every 2 weeks      Past Medical history   Past Medical History:   Diagnosis Date    RA (rheumatoid arthritis) (H)        Objective   /79 (BP Location: Left arm, Patient Position: Sitting, Cuff Size: Adult Regular)   Pulse 79   Wt 64 kg (141 lb)   LMP 05/15/2024 (Approximate)   SpO2 98%   BMI 23.46 kg/m        PHYSICAL EXAMINATION  Physical Exam  HENT:      Head: Normocephalic.      Mouth/Throat:      Mouth: Mucous membranes are moist.   Eyes:      Conjunctiva/sclera: Conjunctivae normal.   Pulmonary:      Effort: Pulmonary effort is normal.      Breath sounds: Normal breath sounds.   Musculoskeletal:         General: No swelling or tenderness.      Comments: No noticeable swelling or significant tenderness in the hands( MCP, PIP, DIP), wrists, elbows, knees, ankles, feet. Range of motion in the shoulders and hips are within accepted range for age.     No tenderness in non-joint areas.     SJC:0/28  TJC: 0/28    Pain scale is 0/10   Skin:     Findings: No rash.   Neurological:      Mental Status: She is alert.         Assessment & Plan   She has been evaluated by multiple rheumatologists throughout the years.  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    Family history of psoriatic arthritis in mother, maternal grandmother and aunt with rheumatoid arthritis    Treatment included: Plaquenil, methotrexate, Humira    Most recent regimen in 2024 is Humira injection every 2 weeks     # History of juvenile rheumatoid arthritis  # CCP positive, rheumatoid factor negative rheumatoid arthritis  # FIOR positive, negative dsDNA, RANDY  # History of low C4 with normal C3  # Screening for chronic infections  # Longitudinal care         # History of juvenile rheumatoid arthritis  # CCP positive, rheumatoid factor negative rheumatoid arthritis    She is doing overall well from rheumatoid arthritis  standpoint.  She denies symptoms suggestive of active arthritis and there is no limitation in her activities related to RA.  She is tolerating Humira well without notable side effects.  There is no active arthritis today on exam.. CDAI score is 0 reflecting remission.    Plan:  - Continue Humira subcutaneous injection every 2 weeks      # FIOR positive, negative dsDNA, RANDY  # History of low C4 with normal C3  No clinical symptoms suggestive of lupus.    Plan:  -Repeat complement C3 and C4      # Screening for chronic infections  2023  Hepatitis C antibody: Negative     2022  QuantiFERON gold: Negative    2021  Hepatitis B surface antibody: Negative     2015  Hepatitis B surface antigen: Negative    Plan:  - Repeat hepatitis B profile    # Need for vaccination  Hepatitis B surface antibody previously checked and was negative.  We will repeat level and if negative then recommend hepatitis B vaccination    # Longitudinal care  The longitudinal plan of care for the diagnosis(es)/condition(s) as documented were addressed during this visit. Due to the added complexity in care, I will continue to support William in the subsequent management and with ongoing continuity of care.      50 minutes spent by me on the date of the encounter doing chart review, history and exam, documentation and further activities per the note      Return in about 5 months (around 11/12/2024) for Follow up.    Jimmie Rosales MD  [facility] RHEUMATOLOGY

## 2024-06-12 NOTE — LETTER
6/12/2024       RE: William Payne  [address]  [address]  [place] [address]     Dear Colleague,    Thank you for referring your patient, William Payne, to the [facility] RHEUMATOLOGY at [facility]. Please see a copy of my visit note below.    RHEUMATOLOGY OUTPATIENT CLINIC NOTE     Referring Provider: Alex Johnson MD     Rheumatology history:  She has been evaluated by multiple rheumatologists throughout the years.  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    Family history of psoriatic arthritis in mother, maternal grandmother and aunt with rheumatoid arthritis    Treatment included: Plaquenil, methotrexate, Humira    Lab review     RF:  Negative    CCP Ab:  Positive , 194, 217 positive    HLA B27; negative     FIOR: Positive     dsDNA:  Negative    RANDY panel:  Negative    SSA: Negative    SSB: Negative         Imaging review     X-ray right hand 11/2022: No erosions   X-ray sacroiliac joints 2014: No evidence of sacroiliitis     MRI Left wrist 2014: No acute internal derangement. No significant abnormal enhancement or bony erosion       Subjective    Visit date June 12, 2024    William is a 23 year old female who presents today for follow up.  Her last appointment was around April 2023.    Since the last visit,    - She had flare in February involving hands, sternoclavicular, shoulders, treated with IM injection Solumedrol       Today, William mentioned the following:    She feels her arthritis is under control.   She remains intermittent joint pain (shoulders, sternoclavicular, hands, hips) treated with ibuprofen once and symptoms improve. These episodes are not frequent and she does not feel rheumatoid arthritis is limiting her activities.     She denies raynaud's, sicca symptoms, skin rash    Review of  Patient on ventilator with documented settings.  Patient intubated with 8 ETT secured at 24 cm at the lip.  Alarms are adequately set and functioning properly.  AMBU bag and mask at bedside.  Will continue to monitor.    Systems   Constitutional:  Negative for fever and weight loss.   Respiratory:  Negative for cough and shortness of breath.    Musculoskeletal:  Negative for joint pain.        Joint swelling: None visible  Morning stiffness none   Skin:  Negative for rash.     Current Medications   Humira injection every 2 weeks     Past Medical history   Past Medical History:   Diagnosis Date    RA (rheumatoid arthritis) (H)        Objective  /79 (BP Location: Left arm, Patient Position: Sitting, Cuff Size: Adult Regular)   Pulse 79   Wt 64 kg (141 lb)   LMP 05/15/2024 (Approximate)   SpO2 98%   BMI 23.46 kg/m        PHYSICAL EXAMINATION  Physical Exam  HENT:      Head: Normocephalic.      Mouth/Throat:      Mouth: Mucous membranes are moist.   Eyes:      Conjunctiva/sclera: Conjunctivae normal.   Pulmonary:      Effort: Pulmonary effort is normal.      Breath sounds: Normal breath sounds.   Musculoskeletal:         General: No swelling or tenderness.      Comments: No noticeable swelling or significant tenderness in the hands( MCP, PIP, DIP), wrists, elbows, knees, ankles, feet. Range of motion in the shoulders and hips are within accepted range for age.     No tenderness in non-joint areas.     SJC:0/28  TJC: 0/28    Pain scale is 0/10   Skin:     Findings: No rash.   Neurological:      Mental Status: She is alert.         Assessment & Plan  She has been evaluated by multiple rheumatologists throughout the years.  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    Family history of psoriatic arthritis in mother, maternal grandmother and aunt with rheumatoid arthritis    Treatment included: Plaquenil, methotrexate, Humira    Most recent regimen in 2024 is Humira injection every 2 weeks     # History of juvenile rheumatoid arthritis  # CCP positive, rheumatoid factor negative rheumatoid  arthritis  # FIOR positive, negative dsDNA, RANDY  # History of low C4 with normal C3  # Screening for chronic infections  # Longitudinal care         # History of juvenile rheumatoid arthritis  # CCP positive, rheumatoid factor negative rheumatoid arthritis    She is doing overall well from rheumatoid arthritis standpoint.  She denies symptoms suggestive of active arthritis and there is no limitation in her activities related to RA.  She is tolerating Humira well without notable side effects.  There is no active arthritis today on exam.. CDAI score is 0 reflecting remission.    Plan:  - Continue Humira subcutaneous injection every 2 weeks      # FIOR positive, negative dsDNA, RANDY  # History of low C4 with normal C3  No clinical symptoms suggestive of lupus.    Plan:  -Repeat complement C3 and C4      # Screening for chronic infections  2023  Hepatitis C antibody: Negative     2022  QuantiFERON gold: Negative    2021  Hepatitis B surface antibody: Negative     2015  Hepatitis B surface antigen: Negative    Plan:  - Repeat hepatitis B profile    # Need for vaccination  Hepatitis B surface antibody previously checked and was negative.  We will repeat level and if negative then recommend hepatitis B vaccination    # Longitudinal care  The longitudinal plan of care for the diagnosis(es)/condition(s) as documented were addressed during this visit. Due to the added complexity in care, I will continue to support William in the subsequent management and with ongoing continuity of care.      50 minutes spent by me on the date of the encounter doing chart review, history and exam, documentation and further activities per the note      Return in about 5 months (around 11/12/2024) for Follow up.    Jimmie Rosales MD  [facility] RHEUMATOLOGY

## 2024-06-12 NOTE — PROGRESS NOTES
Assessment & Plan     (H92.03) Otalgia of both ears  (primary encounter diagnosis)  Comment: Patient presents to the clinic with chief complaint of bilateral ear pain.  She states the pain originally started April 24 and has significantly gotten more painful over time.  Upon exam patient has bilateral clear effusions behind the TM.  Advised using Flonase twice daily for at least 2 weeks to bring down inflammation to help move the fluid.  Will also prescribe 3 days of a low-dose steroid to bring down inflammation to promote movement of the fluid.  Patient's throat was also erythematous on exam however not evident for strep.  Offered strep testing today but patient states she has had strep in the past and it does not feel like strep throat.  She is respectfully declined at this time I am in agreement with bypassing the strep test.  Plan: predniSONE (DELTASONE) 20 MG tablet        Medication sent to pharmacy discussed side effects of the medication.  Also discussed red flag warnings to return to the clinic for further evaluation.  Patient verbalizes understanding                Subjective   William is a 23 year old, presenting for the following health issues: Patient presents to the clinic with a chief complaint of ear pain since April 24.  She states in the beginning it was more intermittent pain.  However as of late has been more constant and is causing her to have more throat pain as well.  She denies any fevers at this time she denies any drainage from the ears.  She has tried Zyrtec because she does have seasonal allergies and as of 2 days ago she did start using Flonase once daily.  No other complaints today.  Ear Problem (Both ear pressure )        6/12/2024    10:12 AM   Additional Questions   Roomed by Sherlyni   Accompanied by Self     Ear Problem    History of Present Illness       Reason for visit:  Ear pain  Symptom onset:  3-4 weeks ago  Symptoms include:  Ear popping/pain, sometimes throat pain  Symptom  "intensity:  Moderate  Symptom progression:  Staying the same  Had these symptoms before:  No  What makes it worse:  No  What makes it better:  No    She eats 2-3 servings of fruits and vegetables daily.She consumes 0 sweetened beverage(s) daily.She exercises with enough effort to increase her heart rate 60 or more minutes per day.  She exercises with enough effort to increase her heart rate 6 days per week.   She is taking medications regularly.                 Review of Systems  Constitutional, HEENT, cardiovascular, pulmonary, gi and gu systems are negative, except as otherwise noted.      Objective    /74 (BP Location: Right arm, Patient Position: Sitting, Cuff Size: Adult Regular)   Pulse 92   Temp 97.6  F (36.4  C) (Tympanic)   Resp 20   Ht 1.651 m (5' 5\")   Wt 64.5 kg (142 lb 1.6 oz)   LMP 05/12/2024 (Exact Date)   SpO2 98%   BMI 23.65 kg/m    Body mass index is 23.65 kg/m .  Physical Exam   GENERAL: alert and no distress  HENT: both ears: clear effusion, oral mucous membranes moist, and tonsillar erythema            Signed Electronically by: JACE Hardy CNP    "

## 2024-06-14 ENCOUNTER — MYC MEDICAL ADVICE (OUTPATIENT)
Dept: INTERNAL MEDICINE | Facility: CLINIC | Age: 24
End: 2024-06-14
Payer: COMMERCIAL

## 2024-06-14 ENCOUNTER — TELEPHONE (OUTPATIENT)
Dept: RHEUMATOLOGY | Facility: CLINIC | Age: 24
End: 2024-06-14
Payer: COMMERCIAL

## 2024-06-14 DIAGNOSIS — H92.03 OTALGIA OF BOTH EARS: Primary | ICD-10-CM

## 2024-06-14 RX ORDER — AMOXICILLIN 875 MG
875 TABLET ORAL 2 TIMES DAILY
Qty: 14 TABLET | Refills: 0 | Status: SHIPPED | OUTPATIENT
Start: 2024-06-14 | End: 2024-06-21

## 2024-06-14 NOTE — TELEPHONE ENCOUNTER
MTM referral from: [facility] visit (referral by provider)    MTM referral outreach attempt #2 on June 14, 2024 at 1:02 PM      Outcome: Patient not reachable after several attempts, routed to Pharmacist Team/Provider as an FYI    Use hbc for the carrier/Plan on the flowsheet      [employer] Message Sent    Romulo Cooper  MTM

## 2024-06-14 NOTE — TELEPHONE ENCOUNTER
I will send in an antibiotic to start today. If not improvement, we will send to ENT for further evaluation.

## 2024-06-17 ENCOUNTER — LAB (OUTPATIENT)
Dept: LAB | Facility: CLINIC | Age: 24
End: 2024-06-17
Payer: COMMERCIAL

## 2024-06-17 DIAGNOSIS — Z11.59 NEED FOR HEPATITIS B SCREENING TEST: ICD-10-CM

## 2024-06-17 DIAGNOSIS — M05.79 RHEUMATOID ARTHRITIS INVOLVING MULTIPLE SITES WITH POSITIVE RHEUMATOID FACTOR (H): ICD-10-CM

## 2024-06-17 DIAGNOSIS — D84.1 HYPOCOMPLEMENTEMIA (H): ICD-10-CM

## 2024-06-17 LAB
ALBUMIN UR-MCNC: NEGATIVE MG/DL
APPEARANCE UR: CLEAR
AST SERPL W P-5'-P-CCNC: 24 U/L (ref 0–45)
BACTERIA #/AREA URNS HPF: ABNORMAL /HPF
BASOPHILS # BLD AUTO: 0 10E3/UL (ref 0–0.2)
BASOPHILS NFR BLD AUTO: 1 %
BILIRUB UR QL STRIP: NEGATIVE
CK SERPL-CCNC: 88 U/L (ref 26–192)
COLOR UR AUTO: YELLOW
CREAT SERPL-MCNC: 0.89 MG/DL (ref 0.51–0.95)
CRP SERPL-MCNC: <3 MG/L
EGFRCR SERPLBLD CKD-EPI 2021: >90 ML/MIN/1.73M2
EOSINOPHIL # BLD AUTO: 0.8 10E3/UL (ref 0–0.7)
EOSINOPHIL NFR BLD AUTO: 12 %
ERYTHROCYTE [DISTWIDTH] IN BLOOD BY AUTOMATED COUNT: 12.1 % (ref 10–15)
ERYTHROCYTE [SEDIMENTATION RATE] IN BLOOD BY WESTERGREN METHOD: 10 MM/HR (ref 0–20)
GLUCOSE UR STRIP-MCNC: NEGATIVE MG/DL
HBV CORE AB SERPL QL IA: NONREACTIVE
HBV SURFACE AB SERPL IA-ACNC: <3.5 M[IU]/ML
HBV SURFACE AB SERPL IA-ACNC: NONREACTIVE M[IU]/ML
HBV SURFACE AG SERPL QL IA: NONREACTIVE
HCT VFR BLD AUTO: 41.1 % (ref 35–47)
HGB BLD-MCNC: 14.5 G/DL (ref 11.7–15.7)
HGB UR QL STRIP: NEGATIVE
IMM GRANULOCYTES # BLD: 0 10E3/UL
IMM GRANULOCYTES NFR BLD: 0 %
KETONES UR STRIP-MCNC: NEGATIVE MG/DL
LEUKOCYTE ESTERASE UR QL STRIP: NEGATIVE
LYMPHOCYTES # BLD AUTO: 3.3 10E3/UL (ref 0.8–5.3)
LYMPHOCYTES NFR BLD AUTO: 52 %
MCH RBC QN AUTO: 31.3 PG (ref 26.5–33)
MCHC RBC AUTO-ENTMCNC: 35.3 G/DL (ref 31.5–36.5)
MCV RBC AUTO: 89 FL (ref 78–100)
MONOCYTES # BLD AUTO: 0.5 10E3/UL (ref 0–1.3)
MONOCYTES NFR BLD AUTO: 8 %
NEUTROPHILS # BLD AUTO: 1.8 10E3/UL (ref 1.6–8.3)
NEUTROPHILS NFR BLD AUTO: 28 %
NITRATE UR QL: NEGATIVE
NRBC # BLD AUTO: 0 10E3/UL
NRBC BLD AUTO-RTO: 0 /100
PH UR STRIP: 6 [PH] (ref 5–7)
PLATELET # BLD AUTO: 235 10E3/UL (ref 150–450)
RBC # BLD AUTO: 4.63 10E6/UL (ref 3.8–5.2)
RBC #/AREA URNS AUTO: ABNORMAL /HPF
SP GR UR STRIP: 1.01 (ref 1–1.03)
SQUAMOUS #/AREA URNS AUTO: ABNORMAL /LPF
UROBILINOGEN UR STRIP-ACNC: 0.2 E.U./DL
WBC # BLD AUTO: 6.4 10E3/UL (ref 4–11)
WBC #/AREA URNS AUTO: ABNORMAL /HPF

## 2024-06-17 PROCEDURE — 84450 TRANSFERASE (AST) (SGOT): CPT

## 2024-06-17 PROCEDURE — 86706 HEP B SURFACE ANTIBODY: CPT

## 2024-06-17 PROCEDURE — 82565 ASSAY OF CREATININE: CPT

## 2024-06-17 PROCEDURE — 36415 COLL VENOUS BLD VENIPUNCTURE: CPT

## 2024-06-17 PROCEDURE — 85025 COMPLETE CBC W/AUTO DIFF WBC: CPT

## 2024-06-17 PROCEDURE — 87340 HEPATITIS B SURFACE AG IA: CPT

## 2024-06-17 PROCEDURE — 81001 URINALYSIS AUTO W/SCOPE: CPT

## 2024-06-17 PROCEDURE — 86140 C-REACTIVE PROTEIN: CPT

## 2024-06-17 PROCEDURE — 86704 HEP B CORE ANTIBODY TOTAL: CPT

## 2024-06-17 PROCEDURE — 86160 COMPLEMENT ANTIGEN: CPT

## 2024-06-17 PROCEDURE — 82550 ASSAY OF CK (CPK): CPT

## 2024-06-17 PROCEDURE — 85652 RBC SED RATE AUTOMATED: CPT

## 2024-06-18 LAB
C3 SERPL-MCNC: 104 MG/DL (ref 81–157)
C4 SERPL-MCNC: 12 MG/DL (ref 13–39)

## 2024-06-18 NOTE — TELEPHONE ENCOUNTER
MTM Referral outreach attempt #3 was made on 06/18/2024.     Outcome: Sent patient MyChart message explaining more in-depth what MTM is and how we can best support them. Attached ability to schedule from message, as well as offered opportunity to call me directly for help with scheduling.

## 2024-06-27 NOTE — RESULT ENCOUNTER NOTE
Test results are stable.  Slightly low C4 and elevated eosinophilic count ?  Allergy.  IgE level and IgG4 levels pending  Inflammatory markers, AST, ALT unremarkable. The plan remain as discussed in the last visit.     Jimmie Rosales MD

## 2024-06-28 ENCOUNTER — TELEPHONE (OUTPATIENT)
Dept: RHEUMATOLOGY | Facility: CLINIC | Age: 24
End: 2024-06-28

## 2024-06-28 NOTE — TELEPHONE ENCOUNTER
----- Message from Jimmie Rosales sent at 6/27/2024 12:42 PM CDT -----  [facility] team,  Can we have William draw IgE and IgG subclassess. The lab did not have remaining samples to be added on. This is to investigate low C4.     Thank you

## 2024-07-01 ENCOUNTER — LAB (OUTPATIENT)
Dept: LAB | Facility: CLINIC | Age: 24
End: 2024-07-01
Payer: COMMERCIAL

## 2024-07-01 DIAGNOSIS — D84.1 HYPOCOMPLEMENTEMIA (H): ICD-10-CM

## 2024-07-01 PROCEDURE — 82787 IGG 1 2 3 OR 4 EACH: CPT

## 2024-07-01 PROCEDURE — 82785 ASSAY OF IGE: CPT

## 2024-07-01 PROCEDURE — 36415 COLL VENOUS BLD VENIPUNCTURE: CPT

## 2024-07-01 PROCEDURE — 82784 ASSAY IGA/IGD/IGG/IGM EACH: CPT

## 2024-07-02 LAB
IGE SERPL-ACNC: 45 KU/L (ref 0–114)
IGG SERPL-MCNC: 1255 MG/DL (ref 610–1616)
IGG1 SER-MCNC: 672 MG/DL (ref 382–929)
IGG2 SER-MCNC: 385 MG/DL (ref 242–700)
IGG3 SER-MCNC: 178 MG/DL (ref 22–176)
IGG4 SER-MCNC: 19 MG/DL (ref 4–86)
SUBCLASSES, PERCENT: 100 %

## 2024-07-03 ENCOUNTER — TELEPHONE (OUTPATIENT)
Dept: RHEUMATOLOGY | Facility: CLINIC | Age: 24
End: 2024-07-03
Payer: COMMERCIAL

## 2024-07-03 NOTE — TELEPHONE ENCOUNTER
PA Initiation    Medication: HUMIRA *CF* PEN 40 MG/0.4ML SC PNKT  Insurance Company: [employer] ([facility]) - Phone [phone] Fax [phone]  Pharmacy Filling the Rx: [facility] - [place], [place] - [address]  Filling Pharmacy Phone:    Filling Pharmacy Fax:    Start Date: 7/3/2024    [id number]

## 2024-07-03 NOTE — TELEPHONE ENCOUNTER
Prior Authorization Approval    Medication: HUMIRA *CF* PEN 40 MG/0.4ML SC PNKT  Authorization Effective Date: 7/3/2024  Authorization Expiration Date: 7/3/2025  Approved Dose/Quantity: 2 per 28 days  Reference #: [id number]   Insurance Company: [employer] ([facility]) - Phone [phone] Fax [phone]  Expected CoPay: $    CoPay Card Available:      Financial Assistance Needed: Unknown  Which Pharmacy is filling the prescription: [facility] - [address]  Pharmacy Notified: Not needed  Patient Notified:Not needed

## 2024-07-07 DIAGNOSIS — D72.10 EOSINOPHILIA, UNSPECIFIED TYPE: Primary | ICD-10-CM

## 2024-07-07 NOTE — RESULT ENCOUNTER NOTE
IgG4 levels normal  IgE level normal    Peripheral eosinophilia to be monitored repeat CBC      Jimmie Rosales MD

## 2024-07-10 ENCOUNTER — MYC REFILL (OUTPATIENT)
Dept: PEDIATRICS | Facility: CLINIC | Age: 24
End: 2024-07-10
Payer: COMMERCIAL

## 2024-07-10 DIAGNOSIS — L70.0 ACNE VULGARIS: ICD-10-CM

## 2024-07-10 RX ORDER — AZELAIC ACID 0.15 G/G
GEL TOPICAL 2 TIMES DAILY
Qty: 50 G | Refills: 4 | OUTPATIENT
Start: 2024-07-10

## 2024-07-13 DIAGNOSIS — T78.40XA ALLERGY, INITIAL ENCOUNTER: ICD-10-CM

## 2024-07-13 DIAGNOSIS — D72.10 EOSINOPHILIA, UNSPECIFIED TYPE: Primary | ICD-10-CM

## 2024-07-23 ENCOUNTER — THERAPY VISIT (OUTPATIENT)
Dept: PHYSICAL THERAPY | Facility: CLINIC | Age: 24
End: 2024-07-23
Attending: STUDENT IN AN ORGANIZED HEALTH CARE EDUCATION/TRAINING PROGRAM
Payer: COMMERCIAL

## 2024-07-23 DIAGNOSIS — R68.84 JAW PAIN: ICD-10-CM

## 2024-07-23 DIAGNOSIS — M26.623 BILATERAL TEMPOROMANDIBULAR JOINT PAIN: Primary | ICD-10-CM

## 2024-07-23 PROCEDURE — 97161 PT EVAL LOW COMPLEX 20 MIN: CPT | Mod: GP | Performed by: PHYSICAL THERAPIST

## 2024-07-23 PROCEDURE — 97530 THERAPEUTIC ACTIVITIES: CPT | Mod: GP | Performed by: PHYSICAL THERAPIST

## 2024-07-23 PROCEDURE — 97110 THERAPEUTIC EXERCISES: CPT | Mod: GP | Performed by: PHYSICAL THERAPIST

## 2024-07-23 NOTE — PROGRESS NOTES
"PHYSICAL THERAPY EVALUATION  Type of Visit: Evaluation     Fall Risk Screen:  Fall screen completed by: PT  Have you fallen 2 or more times in the past year?: No  Have you fallen and had an injury in the past year?: No  Is patient a fall risk?: No    Subjective       Presenting condition or subjective complaint: Jaw/ear pain. Jaw cracking. Ear pressure. Patient has had an onset of B jaw/ear pain with \"pressure\" in the ears x 3 months without incident. The ear pressure is constant. The jaw pain is primarily first thing in the morning where she has limited ability to open her mouth until she \"cracks\" her jaw. After that she has full opening the rest of the day and can eat all foods. No hx of jaw issues.  Date of onset: 22    Relevant medical history: Arthritis   Past Medical History:   Diagnosis Date    RA (rheumatoid arthritis) (H)        Dates & types of surgery: [place] teeth removal   Past Surgical History:   Procedure Laterality Date    wisdom teeth           Prior diagnostic imaging/testing results:   none    Prior therapy history for the same diagnosis, illness or injury: No      Living Environment  Social support: Alone   Type of home: Apartment/condo   Stairs to enter the home: No       Ramp: No   Stairs inside the home: No       Help at home: None  Equipment owned:       Employment: Yes Registered nurse  Hobbies/Interests: Working out, reading    Patient goals for therapy: Open my mouth all the way and i cant open my mouth when i wqke up first thing in the morning    Pain assessment: Location: B jaw/ear/Ratin/10     Objective   TMJ/TMD EVALUATION  ADDITIONAL HISTORY:  Parafunctional habits: Clenching  Stressors: work  Associate symptoms: ear pressure  Headache: No reports of headaches  Symptoms reported: Clicking  Joint lock: Joint does not lock    PAIN: Pain Level at Rest: 4/10  Pain is Exacerbated By: nothing increases the sx's. She has difficulty opening her mouth first thing each morning until " "she \" cracks\" her jaw.  OBSERVATION/FACIAL SYMMETRY: symmetrical   POSITIONING:  WNL    POSTURE:  good cervical posture    INTRAORAL MOUTH APPLIANCE: No  CERVICAL ROM:  cervical movements WNL all directions  TMJ ROM:  opening 46 mm no deviation; lateral trusion 10 mm B  STRENGTH:     Left Right   Mid Trap 4+/5 5/5   Lower Trap 4+/5 5/5   Rhomboid 4+/5 5/5     PALPATION:  MF tightness/tenderness in B masseter  JOINT MOBILITY/ACCESSORY MOTION: WNL B TMJ      Assessment & Plan   CLINICAL IMPRESSIONS  Medical Diagnosis: B TMJ pain    Treatment Diagnosis: B TMJ pain   Impression/Assessment: Patient is a 23 year old female with B TMJ complaints.  The following significant findings have been identified: Pain, Decreased ROM/flexibility, Impaired muscle performance, and Decreased activity tolerance. These impairments interfere with their ability to perform self care tasks and recreational activities as compared to previous level of function.     Clinical Decision Making (Complexity):  Clinical Presentation: Stable/Uncomplicated  Clinical Presentation Rationale: based on medical and personal factors listed in PT evaluation  Clinical Decision Making (Complexity): Low complexity    PLAN OF CARE  Treatment Interventions:  Interventions: Manual Therapy, Neuromuscular Re-education, Therapeutic Activity, Therapeutic Exercise, Self-Care/Home Management    Long Term Goals     PT Goal 1  Goal Identifier: eating/chewing  Goal Description: consistently be able to open mouth to 40 + mm to eat all foods  Rationale: to maximize safety and independence with performance of ADLs and functional tasks;to maximize safety and independence with self cares  Target Date: 09/24/24      Frequency of Treatment: 3 x month  Duration of Treatment: 2 months      Education Assessment:   Learner/Method: Patient;Listening;Demonstration    Risks and benefits of evaluation/treatment have been explained.   Patient/Family/caregiver agrees with Plan of Care. "     Evaluation Time:     PT Eval, Low Complexity Minutes (41961): 16       Signing Clinician: Porsha Leigh PT

## 2024-07-30 ENCOUNTER — THERAPY VISIT (OUTPATIENT)
Dept: PHYSICAL THERAPY | Facility: CLINIC | Age: 24
End: 2024-07-30
Attending: STUDENT IN AN ORGANIZED HEALTH CARE EDUCATION/TRAINING PROGRAM
Payer: COMMERCIAL

## 2024-07-30 DIAGNOSIS — M26.623 BILATERAL TEMPOROMANDIBULAR JOINT PAIN: Primary | ICD-10-CM

## 2024-07-30 PROCEDURE — 97140 MANUAL THERAPY 1/> REGIONS: CPT | Mod: GP | Performed by: PHYSICAL THERAPIST

## 2024-07-30 PROCEDURE — 97110 THERAPEUTIC EXERCISES: CPT | Mod: GP | Performed by: PHYSICAL THERAPIST

## 2024-09-01 DIAGNOSIS — Z30.09 GENERAL COUNSELING FOR PRESCRIPTION OF ORAL CONTRACEPTIVES: ICD-10-CM

## 2024-09-03 RX ORDER — DROSPIRENONE AND ETHINYL ESTRADIOL 0.02-3(28)
1 KIT ORAL DAILY
Qty: 84 TABLET | Refills: 4 | Status: SHIPPED | OUTPATIENT
Start: 2024-09-03

## 2024-09-17 NOTE — TELEPHONE ENCOUNTER
FUTURE VISIT INFORMATION      FUTURE VISIT INFORMATION:  Date: 10/24/24  Time: 9:40AM  Location: [facility]  REFERRAL INFORMATION:  Referring provider:    Referring providers clinic:    Reason for visit/diagnosis  per pt, self refd, ear pain/pressure, [facility] confd     RECORDS REQUESTED FROM:       Clinic name Comments Records Status Imaging Status   [facility]  6/12/24- ov Sandra Telles APRN CNP  [facility]     [facility]  6/17/24- ED CE

## 2024-10-22 DIAGNOSIS — Z01.10 ENCOUNTER FOR HEARING EXAMINATION: Primary | ICD-10-CM

## 2024-10-24 ENCOUNTER — PRE VISIT (OUTPATIENT)
Dept: OTOLARYNGOLOGY | Facility: CLINIC | Age: 24
End: 2024-10-24

## 2024-10-24 ENCOUNTER — OFFICE VISIT (OUTPATIENT)
Dept: AUDIOLOGY | Facility: CLINIC | Age: 24
End: 2024-10-24
Payer: COMMERCIAL

## 2024-10-24 ENCOUNTER — OFFICE VISIT (OUTPATIENT)
Dept: OTOLARYNGOLOGY | Facility: CLINIC | Age: 24
End: 2024-10-24
Payer: COMMERCIAL

## 2024-10-24 VITALS — BODY MASS INDEX: 23.32 KG/M2 | WEIGHT: 140 LBS | HEIGHT: 65 IN

## 2024-10-24 DIAGNOSIS — H93.8X3 EAR PRESSURE, BILATERAL: Primary | ICD-10-CM

## 2024-10-24 DIAGNOSIS — Z01.10 ENCOUNTER FOR HEARING EXAMINATION: ICD-10-CM

## 2024-10-24 PROCEDURE — 92557 COMPREHENSIVE HEARING TEST: CPT | Performed by: AUDIOLOGIST

## 2024-10-24 PROCEDURE — 92550 TYMPANOMETRY & REFLEX THRESH: CPT | Performed by: AUDIOLOGIST

## 2024-10-24 PROCEDURE — 99214 OFFICE O/P EST MOD 30 MIN: CPT | Performed by: REGISTERED NURSE

## 2024-10-24 NOTE — LETTER
10/24/2024       RE: William Payne  [address]  [address]  [facility] [address]     Dear Colleague,    Thank you for referring your patient, William Payne, to the [facility] EAR NOSE AND THROAT CLINIC [place] at [facility]. Please see a copy of my visit note below.      Otolaryngology Clinic  October 24, 2024    Chief Complaint:   Bilateral ear pressure       History of Present Illness:   William Payne is a 24 year old female who presents today for evaluation of bilateral ear pressure and ear popping.  Patient reports bothersome symptoms since April 2024.  Patient reports constant, daily ear pressure and sharp shooting pain deep in ear.  Patient states that ears pop constantly when swallowing or moving their jaw.  Denies any significant pain in hearing but sometimes hearing feels muffled when ears are full.  Patient is able to pop their ears bilaterally which provide some relief only for seconds/minutes.  Patient has a history of rheumatoid arthritis and has started treatment for TMJ including physical therapy, gentle massage and prednisone burst.  Patient has not found relief in any of these interventions.     Past Medical History:  Past Medical History:   Diagnosis Date     RA (rheumatoid arthritis) (H)      Past Surgical History:  Past Surgical History:   Procedure Laterality Date     wisdom teeth       Medications:  Current Outpatient Medications   Medication Sig Dispense Refill     adalimumab (HUMIRA *CF*) 40 MG/0.4ML pen kit Inject 0.4 mLs (40 mg) Subcutaneous every 14 days Hold for signs of infection, then seek medical attention. 2.4 mL 1     azelaic acid (FINACIA) 15 % external gel Apply topically 2 times daily 50 g 4     drospirenone-ethinyl estradiol (BRANDI) 3-0.02 MG tablet TAKE 1 TABLET BY MOUTH DAILY 84 tablet 4       Allergies:  Allergies   Allergen Reactions     Seasonal Allergies Itching     Hay, antoinette grass, ragweed     Citrullus  Vulgaris Itching     All melons        Social History:  Social History     Tobacco Use     Smoking status: Never     Smokeless tobacco: Never   Vaping Use     Vaping status: Never Used   Substance Use Topics     Alcohol use: Yes     Comment: 2 -3 per month     Drug use: Never       ROS: 10 point ROS neg other than the symptoms noted above in the HPI.    Physical Exam:    There were no vitals taken for this visit.     Constitutional:  The patient was well-groomed and in no acute distress.     Skin: Normal:  warm and pink without rash    Neurologic: Alert and oriented x 3.  CN's III-XII within normal limits.  Voice normal.    Psychiatric: The patient's affect was calm, cooperative, and appropriate.     Communication:  Normal; communicates verbally, normal voice quality.    Respiratory: Breathing comfortably without stridor or exertion of accessory muscles.    Head/Face:  Normocephalic and atraumatic.  No lesions or scars.   Salivary glands -  Normal size, no tenderness, swelling, or palpable masses    Eyes: Pupils were equal and reactive.  Extraocular movement intact.    Ears: Pinnae and tragus non-tender.  EAC's and TM's were clear.        Otologic microscope exam:    Right ear was examined under the microscope.  Normal appearing TM, nicely aerated middle ear space.     Left ear was also examined under the microscope.  Normal appearing TM, nicely aerated middle ear space.        Audiogram: 10/24/2024 - data independently reviewed  Normal hearing bilaterally   % at 50 dB bilaterally   Acoustic Reflexes: Present in all conditions  Tympanograms: type A bilaterally     Assessment and Plan:  1. Ear pressure, bilateral (Primary)  Patient with chronic ear pain and pressure. There is no evidence of effusion or retraction of bilateral ears on today's exam. Audiogram and tympanogram are normal. I suspect these symptoms are due to inflammation of the temporomandibular joint.  Patient has tried multiple interventions for  TMJ relief.  Will place referral for our TMJ clinic for a more comprehensive evaluation with management/treatment.      There is no evidence of eustachian tube dysfunction on today's exam.  Again, tympanograms are normal.  Ear exam is very healthy without retraction and middle ear's are well aerated.  Discussed with patient that the sounds of crackling and popping when swallowing are normal as this is due to the soft palate lifting and tissue around the eustachian tube moving.  There are no limitations for patient to fly.  I would expect they would tolerate altitude changes without difficulty.    Patient will follow up as needed for any new change in hearing.    Tessa Palafox DNP, APRN, CNP  Otolaryngology  Head & Neck Surgery  [phone]    30 minutes spent by me on the date of the encounter doing chart review, history and exam, documentation and further activities per the note      Again, thank you for allowing me to participate in the care of your patient.      Sincerely,    Yadira Palafox, NP

## 2024-10-24 NOTE — PROGRESS NOTES
Otolaryngology Clinic  October 24, 2024    Chief Complaint:   Bilateral ear pressure       History of Present Illness:   William Payne is a 24 year old female who presents today for evaluation of bilateral ear pressure and ear popping.  Patient reports bothersome symptoms since April 2024.  Patient reports constant, daily ear pressure and sharp shooting pain deep in ear.  Patient states that ears pop constantly when swallowing or moving their jaw.  Denies any significant pain in hearing but sometimes hearing feels muffled when ears are full.  Patient is able to pop their ears bilaterally which provide some relief only for seconds/minutes.  Patient has a history of rheumatoid arthritis and has started treatment for TMJ including physical therapy, gentle massage and prednisone burst.  Patient has not found relief in any of these interventions.     Past Medical History:  Past Medical History:   Diagnosis Date    RA (rheumatoid arthritis) (H)      Past Surgical History:  Past Surgical History:   Procedure Laterality Date    wisdom teeth       Medications:  Current Outpatient Medications   Medication Sig Dispense Refill    adalimumab (HUMIRA *CF*) 40 MG/0.4ML pen kit Inject 0.4 mLs (40 mg) Subcutaneous every 14 days Hold for signs of infection, then seek medical attention. 2.4 mL 1    azelaic acid (FINACIA) 15 % external gel Apply topically 2 times daily 50 g 4    drospirenone-ethinyl estradiol (BRANDI) 3-0.02 MG tablet TAKE 1 TABLET BY MOUTH DAILY 84 tablet 4       Allergies:  Allergies   Allergen Reactions    Seasonal Allergies Itching     Hay, antoinette grass, ragweed    Citrullus Vulgaris Itching     All melons        Social History:  Social History     Tobacco Use    Smoking status: Never    Smokeless tobacco: Never   Vaping Use    Vaping status: Never Used   Substance Use Topics    Alcohol use: Yes     Comment: 2 -3 per month    Drug use: Never       ROS: 10 point ROS neg other than the symptoms noted above in the  HPI.    Physical Exam:    There were no vitals taken for this visit.     Constitutional:  The patient was well-groomed and in no acute distress.     Skin: Normal:  warm and pink without rash    Neurologic: Alert and oriented x 3.  CN's III-XII within normal limits.  Voice normal.    Psychiatric: The patient's affect was calm, cooperative, and appropriate.     Communication:  Normal; communicates verbally, normal voice quality.    Respiratory: Breathing comfortably without stridor or exertion of accessory muscles.    Head/Face:  Normocephalic and atraumatic.  No lesions or scars.   Salivary glands -  Normal size, no tenderness, swelling, or palpable masses    Eyes: Pupils were equal and reactive.  Extraocular movement intact.    Ears: Pinnae and tragus non-tender.  EAC's and TM's were clear.        Otologic microscope exam:    Right ear was examined under the microscope.  Normal appearing TM, nicely aerated middle ear space.     Left ear was also examined under the microscope.  Normal appearing TM, nicely aerated middle ear space.        Audiogram: 10/24/2024 - data independently reviewed  Normal hearing bilaterally   % at 50 dB bilaterally   Acoustic Reflexes: Present in all conditions  Tympanograms: type A bilaterally     Assessment and Plan:  1. Ear pressure, bilateral (Primary)  Patient with chronic ear pain and pressure. There is no evidence of effusion or retraction of bilateral ears on today's exam. Audiogram and tympanogram are normal. I suspect these symptoms are due to inflammation of the temporomandibular joint.  Patient has tried multiple interventions for TMJ relief.  Will place referral for our TMJ clinic for a more comprehensive evaluation with management/treatment.      There is no evidence of eustachian tube dysfunction on today's exam.  Again, tympanograms are normal.  Ear exam is very healthy without retraction and middle ear's are well aerated.  Discussed with patient that the sounds of  crackling and popping when swallowing are normal as this is due to the soft palate lifting and tissue around the eustachian tube moving.  There are no limitations for patient to fly.  I would expect they would tolerate altitude changes without difficulty.    Patient will follow up as needed for any new change in hearing.    Tessa Palafox DNP, APRN, CNP  Otolaryngology  Head & Neck Surgery  [phone]    30 minutes spent by me on the date of the encounter doing chart review, history and exam, documentation and further activities per the note

## 2024-10-24 NOTE — PROGRESS NOTES
AUDIOLOGY REPORT    SUMMARY: Audiology visit completed. See audiogram for results.      RECOMMENDATIONS: Follow-up with ENT.    Hector Cabezas, [facility]  Licensed Audiologist  [place] License #[id number]

## 2024-10-31 DIAGNOSIS — M05.79 RHEUMATOID ARTHRITIS INVOLVING MULTIPLE SITES WITH POSITIVE RHEUMATOID FACTOR (H): ICD-10-CM

## 2024-10-31 DIAGNOSIS — R76.8 CYCLIC CITRULLINATED PEPTIDE (CCP) ANTIBODY POSITIVE: ICD-10-CM

## 2024-11-04 RX ORDER — ADALIMUMAB 40MG/0.4ML
KIT SUBCUTANEOUS
Qty: 2.4 ML | Refills: 0 | Status: SHIPPED | OUTPATIENT
Start: 2024-11-04

## 2024-11-04 NOTE — TELEPHONE ENCOUNTER
adalimumab (HUMIRA *CF*) 40 MG/0.4ML pen kit       Last Written Prescription Date:  6/12/14  Last Fill Quantity: 2.4ml,   # refills: 1  Last Office Visit : 6/12/24 Bobby  Future Office visit:  11/12/24  Refilled per protocol  Lissa DEL ANGEL RN  [facility] Triage & Med Refill Team

## 2024-11-05 ENCOUNTER — TELEPHONE (OUTPATIENT)
Dept: RHEUMATOLOGY | Facility: CLINIC | Age: 24
End: 2024-11-05
Payer: COMMERCIAL

## 2024-11-05 NOTE — TELEPHONE ENCOUNTER
PA Initiation    Medication: HUMIRA (2 PEN) 40 MG/0.4ML SC AJKT  Insurance Company:    Pharmacy Filling the Rx: [facility] - [place] [address]  Filling Pharmacy Phone:    Filling Pharmacy Fax:    Start Date: 11/5/2024     [id number])        ANNA Santiago, [facility]  Specialty Pharmacy Clinic [facility]    [email or web address]     Phone: [phone]  Fax: [phone]

## 2024-11-06 NOTE — TELEPHONE ENCOUNTER
Prior Authorization Approval    Medication: HUMIRA (2 PEN) 40 MG/0.4ML SC AJKT  Authorization Effective Date: 11/6/2024  Authorization Expiration Date: 5/4/2025  Approved Dose/Quantity: 2  Reference #:     Insurance Company: [employer] - Phone [phone] Fax [phone]  Expected CoPay: $    CoPay Card Available: Yes    Financial Assistance Needed: copay card   Which Pharmacy is filling the prescription: [facility] - [place] [address], [facility]  Specialty Pharmacy Clinic Liaison     [facility] Specialty    [email or web address]     Phone: [phone]  Fax: [phone]

## 2024-11-11 ENCOUNTER — MYC MEDICAL ADVICE (OUTPATIENT)
Dept: ALLERGY | Facility: CLINIC | Age: 24
End: 2024-11-11
Payer: COMMERCIAL

## 2024-11-25 ENCOUNTER — OFFICE VISIT (OUTPATIENT)
Dept: ALLERGY | Facility: CLINIC | Age: 24
End: 2024-11-25
Attending: STUDENT IN AN ORGANIZED HEALTH CARE EDUCATION/TRAINING PROGRAM
Payer: COMMERCIAL

## 2024-11-25 VITALS
BODY MASS INDEX: 23.3 KG/M2 | WEIGHT: 140 LBS | SYSTOLIC BLOOD PRESSURE: 120 MMHG | OXYGEN SATURATION: 99 % | HEART RATE: 79 BPM | DIASTOLIC BLOOD PRESSURE: 78 MMHG

## 2024-11-25 DIAGNOSIS — T78.1XXA ADVERSE FOOD REACTION, INITIAL ENCOUNTER: Primary | ICD-10-CM

## 2024-11-25 DIAGNOSIS — T78.1XXA POLLEN-FOOD ALLERGY, INITIAL ENCOUNTER: ICD-10-CM

## 2024-11-25 DIAGNOSIS — D72.10 EOSINOPHILIA, UNSPECIFIED TYPE: ICD-10-CM

## 2024-11-25 DIAGNOSIS — H57.9 PRURITUS OF BOTH EYES: ICD-10-CM

## 2024-11-25 DIAGNOSIS — R06.7 SNEEZING: ICD-10-CM

## 2024-11-25 DIAGNOSIS — T78.40XA ALLERGY, INITIAL ENCOUNTER: ICD-10-CM

## 2024-11-25 PROCEDURE — 99203 OFFICE O/P NEW LOW 30 MIN: CPT | Mod: 25 | Performed by: INTERNAL MEDICINE

## 2024-11-25 PROCEDURE — 95004 PERQ TESTS W/ALRGNC XTRCS: CPT | Performed by: INTERNAL MEDICINE

## 2024-11-25 NOTE — PROGRESS NOTES
Per provider verbal order, placed Adult Environmental Panel scratch test.  Verbal consent was obtained by MD prior to procedure.  Once panels were placed, patient was monitored for 15 minutes in clinic.  Provider read test after 15 minutes.  Pt tolerated procedure well.  All questions and concerns were addressed at office visit.     MEENU GonzalezN, RN

## 2024-11-25 NOTE — LETTER
11/25/2024      William Payne  [address]  [address]  [place] [address]      Dear Colleague,    Thank you for referring your patient, William Payne, to the [facility] SPECIALTY CLINIC [place]. Please see a copy of my visit note below.    William Payne was seen in the Allergy Clinic at [facility].    William Payne is a 24 year old female being seen today at the request of Jimmie Rosales MD/[facility] in consultation for Eosinophilia and concerns for allergies.    She was referred by rheumatology due to an elevated eosinophil count of 800.  Her total IgE and total IgG were normal.  She is on Humira.  This is for rheumatoid arthritis.  This is well-controlled.    She does have symptoms in the summer and fall particularly when she goes to visit her parents in [place].  She also is around dogs and cats at her parents home and does she have increased itching.  She will have increased sneezing and nasal congestion particularly in [place].  She also has itchy eyes for which she uses Pataday.  There will typically be a month of the year that she requires Zyrtec which works well.  She does not notice any fatigue.    In addition she has symptoms with melons and sometimes apricots will cause itching within her mouth.  The symptoms are mild.  She will still eat these foods.      Past Medical History:   Diagnosis Date     RA (rheumatoid arthritis) (H)      Family History   Problem Relation Age of Onset     Psoriatic Arthritis Mother      Hypertension Father      Rheumatoid Arthritis Maternal Grandmother      Past Surgical History:   Procedure Laterality Date     wisdom teeth         ENVIRONMENTAL HISTORY:   Pets inside the house include None.  Do you smoke cigarettes or other recreational drugs? No There is/are 0 smokers living in the house. The house do not have a damp basement.     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 She  lives with her self.      Review of Systems      Current Outpatient Medications:      azelaic acid (FINACIA) 15 % external gel, Apply topically 2 times daily, Disp: 50 g, Rfl: 4     drospirenone-ethinyl estradiol (BRANDI) 3-0.02 MG tablet, TAKE 1 TABLET BY MOUTH DAILY, Disp: 84 tablet, Rfl: 4     HUMIRA, 2 PEN, 40 MG/0.4ML pen kit, INJECT 40MG SUBCUTANEOUSLY EVERY 2 WEEKS (HOLD FOR SIGNS OF  INFECTION THEN SEEK MEDICAL  ATTENTION), Disp: 2.4 mL, Rfl: 0  Allergies   Allergen Reactions     Seasonal Allergies Itching     Hay, antoinette grass, ragweed     Citrullus Vulgaris Itching     All melons         EXAM:   /78   Pulse 79   Wt 63.5 kg (140 lb)   SpO2 99%   BMI 23.30 kg/m      Physical Exam    Constitutional:       General: She is not in acute distress.     Appearance: Normal appearance. She is not ill-appearing.   HENT:      Head: Normocephalic and atraumatic.      Nose: Nose normal. No congestion or rhinorrhea.      Mouth/Throat:      Mouth: Mucous membranes are moist.      Pharynx: Oropharynx is clear. No posterior oropharyngeal erythema.   Eyes:      General:         Right eye: No discharge.         Left eye: No discharge.   Cardiovascular:      Rate and Rhythm: Normal rate and regular rhythm.      Heart sounds: Normal heart sounds.   Pulmonary:      Effort: Pulmonary effort is normal.      Breath sounds: Normal breath sounds. No wheezing or rhonchi.   Skin:     General: Skin is warm.      Findings: No erythema or rash.   Neurological:      General: No focal deficit present.      Mental Status: She is alert. Mental status is at baseline.   Psychiatric:         Mood and Affect: Mood normal.         Behavior: Behavior normal.      WORKUP: Skin testing was positive to multiple allergens.    ENVIRONMENTAL PERCUTANEOUS SKIN TESTING: ADULT      11/25/2024    10:00 AM   [facility]   Consent Y   Ordering Physician Dr. Elizalde   Interpreting Physician Dr. Elizalde   Testing Technician Amelia MELGAR, RN   Location  Back   Time start: 10:50   Time End: 11:05   Positive Control: Histatrol*ALK 1 mg/ml 5/7   Negative Control: 50% Glycerin 0   Cat Hair*ALK (10,000 BAU/ml) 5/20   AP Dog Hair/Dander (1:100 w/v) 5/25   Dust Mite p. 30,000 AU/ml 0   Dust Mite f. (30,000 AU/ml) 0   Jeremy (W/F in millimeters) 0   Antonio Grass (100,000 BAU/mL) 5/20   Red Sharkey (W/F in millimeters) 3/5   Maple/Clayton (W/F in millimeters) 0   Hackberry (W/F in millimeters) 0   Lea (W/F in millimeters) 0   Wood *ALK (W/F in millimeters) 0   American Elm (W/F in millimeters) 0   Isabela (W/F in millimeters) 3/6   Black Russell (W/F in millimeters) 5/15   Birch Mix (W/F in millimeters) 5/20   North Garden (W/F in millimeters) 0   Oak (W/F in millimeters) 0   Cocklebur (W/F in millimeters) 0   Leavenworth (W/F in millimeters) 5/10   White David (W/F in millimeters) 0   Careless (W/F in millimeters) 0   Nettle (W/F in millimeters) 0   English Plantain (W/F in millimeters) 0   Kochia (W/F in millimeters) 0   Lamb's Quarter (W/F in millimeters) 4/10   Marshelder (W/F in millimeters) 0   Ragweed Mix* ALK (W/F in millimeters) 3/5   Russian Thistle (W/F in millimeters) 5/8   Sagebrush/Mugwort (W/F in millimeters) 5/15   Sheep Sorrel (W/F in millimeters) 0   Feather Mix* ALK (W/F in millimeters) 0   Penicillium Mix (1:10 w/v) 0   Curvularia spicifera (1:10 w/v) 0   Epicoccum (1:10 w/v) 0   Aspergillus fumigatus (1:10 w/v): 0   Alternaria tenius (1:10 w/v) 7/20   H. Cladosporium (1:10 w/v) 0   Phoma herbarum (1:10 w/v) 0         ASSESSMENT/PLAN:  William Payne is a 24 year old female seen today for allergy evaluation due to elevated eosinophils and referred by rheumatology.  Her skin testing is positive and her mild eosinophilia is likely correlated with her allergic rhinitis.  Her blood test previous to this was over a year ago and the eosinophil count was 300.  We proposed repeating her eosinophil count to see if it is still elevated but since it is only  minimally elevated and William does not have a particular concern to repeat the blood test, will hold off on additional testing.    Her allergy tests are positive to multiple allergens.  We discussed treatment options.  I do not see a need for allergy shots at this time as Zyrtec is quite effective.  She also has oral allergy syndrome based on her symptoms when she eats melons with mild itching of the mouth.    She will continue Pataday and Zyrtec as needed.  Allergy shots were discussed but not necessary at this time.  No need to repeat the CBC and differential at this time.  She may continue to eat the foods that cause itching in the mouth since her symptoms are mild.    Follow-up as needed.      Thank you for allowing me to participate in the care of William Payne.      I spent 39 minutes on the date of the encounter doing chart review, history and exam, documentation and further coordination as noted above exclusive of separately reported interpretations    Adonay Elizalde MD  Allergy/Immunology  [facility]      Per provider verbal order, placed Adult Environmental Panel scratch test.  Verbal consent was obtained by MD prior to procedure.  Once panels were placed, patient was monitored for 15 minutes in clinic.  Provider read test after 15 minutes.  Pt tolerated procedure well.  All questions and concerns were addressed at office visit.     MEENU GonzalezN, RN       Again, thank you for allowing me to participate in the care of your patient.        Sincerely,        Adonay Elizalde MD

## 2024-11-25 NOTE — PROGRESS NOTES
William Payne was seen in the Allergy Clinic at [facility].    William Payne is a 24 year old female being seen today at the request of Jimmie Rosales MD/[facility] in consultation for Eosinophilia and concerns for allergies.    She was referred by rheumatology due to an elevated eosinophil count of 800.  Her total IgE and total IgG were normal.  She is on Humira.  This is for rheumatoid arthritis.  This is well-controlled.    She does have symptoms in the summer and fall particularly when she goes to visit her parents in [place].  She also is around dogs and cats at her parents home and does she have increased itching.  She will have increased sneezing and nasal congestion particularly in [place].  She also has itchy eyes for which she uses Pataday.  There will typically be a month of the year that she requires Zyrtec which works well.  She does not notice any fatigue.    In addition she has symptoms with melons and sometimes apricots will cause itching within her mouth.  The symptoms are mild.  She will still eat these foods.      Past Medical History:   Diagnosis Date    RA (rheumatoid arthritis) (H)      Family History   Problem Relation Age of Onset    Psoriatic Arthritis Mother     Hypertension Father     Rheumatoid Arthritis Maternal Grandmother      Past Surgical History:   Procedure Laterality Date    wisdom teeth         ENVIRONMENTAL HISTORY:   Pets inside the house include None.  Do you smoke cigarettes or other recreational drugs? No There is/are 0 smokers living in the house. The house do not have a damp basement.     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 She lives with her self.      Review of Systems      Current Outpatient Medications:     azelaic acid (FINACIA) 15 % external gel, Apply topically 2 times daily, Disp: 50 g, Rfl: 4    drospirenone-ethinyl estradiol (BRANDI) 3-0.02 MG tablet, TAKE 1 TABLET BY MOUTH  DAILY, Disp: 84 tablet, Rfl: 4    HUMIRA, 2 PEN, 40 MG/0.4ML pen kit, INJECT 40MG SUBCUTANEOUSLY EVERY 2 WEEKS (HOLD FOR SIGNS OF  INFECTION THEN SEEK MEDICAL  ATTENTION), Disp: 2.4 mL, Rfl: 0  Allergies   Allergen Reactions    Seasonal Allergies Itching     Hay, antoinette grass, ragweed    Citrullus Vulgaris Itching     All melons         EXAM:   /78   Pulse 79   Wt 63.5 kg (140 lb)   SpO2 99%   BMI 23.30 kg/m      Physical Exam    Constitutional:       General: She is not in acute distress.     Appearance: Normal appearance. She is not ill-appearing.   HENT:      Head: Normocephalic and atraumatic.      Nose: Nose normal. No congestion or rhinorrhea.      Mouth/Throat:      Mouth: Mucous membranes are moist.      Pharynx: Oropharynx is clear. No posterior oropharyngeal erythema.   Eyes:      General:         Right eye: No discharge.         Left eye: No discharge.   Cardiovascular:      Rate and Rhythm: Normal rate and regular rhythm.      Heart sounds: Normal heart sounds.   Pulmonary:      Effort: Pulmonary effort is normal.      Breath sounds: Normal breath sounds. No wheezing or rhonchi.   Skin:     General: Skin is warm.      Findings: No erythema or rash.   Neurological:      General: No focal deficit present.      Mental Status: She is alert. Mental status is at baseline.   Psychiatric:         Mood and Affect: Mood normal.         Behavior: Behavior normal.      WORKUP: Skin testing was positive to multiple allergens.    ENVIRONMENTAL PERCUTANEOUS SKIN TESTING: ADULT      11/25/2024    10:00 AM   [facility]   [facility]   Ordering Physician Dr. Elizalde   Interpreting Physician Dr. Elizalde   Testing Technician Amelia MELGAR RN   Location Back   Time start: 10:50   Time End: 11:05   Positive Control: Histatrol*ALK 1 mg/ml 5/7   Negative Control: 50% Glycerin 0   Cat Hair*ALK (10,000 BAU/ml) 5/20   AP Dog Hair/Dander (1:100 w/v) 5/25   Dust Mite p. 30,000 AU/ml 0   Dust Mite f. (30,000 AU/ml) 0    Jeremy (W/F in millimeters) 0   Antonio Grass (100,000 BAU/mL) 5/20   Red McCone (W/F in millimeters) 3/5   Maple/Birmingham (W/F in millimeters) 0   Hackberry (W/F in millimeters) 0   Racine (W/F in millimeters) 0   [place] *ALK (W/F in millimeters) 0   American Elm (W/F in millimeters) 0   [place] (W/F in millimeters) 3/6   Black [place] (W/F in millimeters) 5/15   Birch Mix (W/F in millimeters) 5/20   Cushman (W/F in millimeters) 0   Oak (W/F in millimeters) 0   Cocklebur (W/F in millimeters) 0   [place] (W/F in millimeters) 5/10   White David (W/F in millimeters) 0   Careless (W/F in millimeters) 0   Nettle (W/F in millimeters) 0   English Plantain (W/F in millimeters) 0   Kochia (W/F in millimeters) 0   Lamb's Quarter (W/F in millimeters) 4/10   Marshelder (W/F in millimeters) 0   Ragweed Mix* ALK (W/F in millimeters) 3/5   Russian Thistle (W/F in millimeters) 5/8   Sagebrush/Mugwort (W/F in millimeters) 5/15   Sheep Sorrel (W/F in millimeters) 0   Feather Mix* ALK (W/F in millimeters) 0   Penicillium Mix (1:10 w/v) 0   Curvularia spicifera (1:10 w/v) 0   Epicoccum (1:10 w/v) 0   Aspergillus fumigatus (1:10 w/v): 0   Alternaria tenius (1:10 w/v) 7/20   H. Cladosporium (1:10 w/v) 0   Phoma herbarum (1:10 w/v) 0         ASSESSMENT/PLAN:  William Payne is a 24 year old female seen today for allergy evaluation due to elevated eosinophils and referred by rheumatology.  Her skin testing is positive and her mild eosinophilia is likely correlated with her allergic rhinitis.  Her blood test previous to this was over a year ago and the eosinophil count was 300.  We proposed repeating her eosinophil count to see if it is still elevated but since it is only minimally elevated and William does not have a particular concern to repeat the blood test, will hold off on additional testing.    Her allergy tests are positive to multiple allergens.  We discussed treatment options.  I do not see a need for allergy shots at  this time as Zyrtec is quite effective.  She also has oral allergy syndrome based on her symptoms when she eats melons with mild itching of the mouth.    She will continue Pataday and Zyrtec as needed.  Allergy shots were discussed but not necessary at this time.  No need to repeat the CBC and differential at this time.  She may continue to eat the foods that cause itching in the mouth since her symptoms are mild.    Follow-up as needed.      Thank you for allowing me to participate in the care of William LUANNE Payne.      I spent 39 minutes on the date of the encounter doing chart review, history and exam, documentation and further coordination as noted above exclusive of separately reported interpretations    Adonay Elizalde MD  Allergy/Immunology  [facility]

## 2024-12-04 ENCOUNTER — OFFICE VISIT (OUTPATIENT)
Dept: RHEUMATOLOGY | Facility: CLINIC | Age: 24
End: 2024-12-04
Attending: STUDENT IN AN ORGANIZED HEALTH CARE EDUCATION/TRAINING PROGRAM
Payer: COMMERCIAL

## 2024-12-04 ENCOUNTER — LAB (OUTPATIENT)
Dept: LAB | Facility: CLINIC | Age: 24
End: 2024-12-04
Payer: COMMERCIAL

## 2024-12-04 VITALS
BODY MASS INDEX: 23.3 KG/M2 | SYSTOLIC BLOOD PRESSURE: 120 MMHG | HEART RATE: 85 BPM | OXYGEN SATURATION: 98 % | DIASTOLIC BLOOD PRESSURE: 73 MMHG | WEIGHT: 140 LBS

## 2024-12-04 DIAGNOSIS — M05.79 RHEUMATOID ARTHRITIS INVOLVING MULTIPLE SITES WITH POSITIVE RHEUMATOID FACTOR (H): ICD-10-CM

## 2024-12-04 DIAGNOSIS — R76.8 CYCLIC CITRULLINATED PEPTIDE (CCP) ANTIBODY POSITIVE: ICD-10-CM

## 2024-12-04 DIAGNOSIS — D72.10 EOSINOPHILIA, UNSPECIFIED TYPE: ICD-10-CM

## 2024-12-04 LAB
ALBUMIN UR-MCNC: NEGATIVE MG/DL
APPEARANCE UR: CLEAR
AST SERPL W P-5'-P-CCNC: 37 U/L (ref 0–45)
BACTERIA #/AREA URNS HPF: ABNORMAL /HPF
BASOPHILS # BLD AUTO: 0.1 10E3/UL (ref 0–0.2)
BASOPHILS NFR BLD AUTO: 1 %
BILIRUB UR QL STRIP: NEGATIVE
COLOR UR AUTO: YELLOW
CREAT SERPL-MCNC: 0.85 MG/DL (ref 0.51–0.95)
CRP SERPL-MCNC: <3 MG/L
EGFRCR SERPLBLD CKD-EPI 2021: >90 ML/MIN/1.73M2
EOSINOPHIL # BLD AUTO: 0.7 10E3/UL (ref 0–0.7)
EOSINOPHIL NFR BLD AUTO: 9 %
ERYTHROCYTE [DISTWIDTH] IN BLOOD BY AUTOMATED COUNT: 12.2 % (ref 10–15)
ERYTHROCYTE [SEDIMENTATION RATE] IN BLOOD BY WESTERGREN METHOD: 10 MM/HR (ref 0–20)
GLUCOSE UR STRIP-MCNC: NEGATIVE MG/DL
HCT VFR BLD AUTO: 40.6 % (ref 35–47)
HGB BLD-MCNC: 14.2 G/DL (ref 11.7–15.7)
HGB UR QL STRIP: NEGATIVE
IMM GRANULOCYTES # BLD: 0 10E3/UL
IMM GRANULOCYTES NFR BLD: 0 %
KETONES UR STRIP-MCNC: NEGATIVE MG/DL
LEUKOCYTE ESTERASE UR QL STRIP: NEGATIVE
LYMPHOCYTES # BLD AUTO: 4.2 10E3/UL (ref 0.8–5.3)
LYMPHOCYTES NFR BLD AUTO: 54 %
MCH RBC QN AUTO: 31.1 PG (ref 26.5–33)
MCHC RBC AUTO-ENTMCNC: 35 G/DL (ref 31.5–36.5)
MCV RBC AUTO: 89 FL (ref 78–100)
MONOCYTES # BLD AUTO: 0.6 10E3/UL (ref 0–1.3)
MONOCYTES NFR BLD AUTO: 8 %
MUCOUS THREADS #/AREA URNS LPF: PRESENT /LPF
NEUTROPHILS # BLD AUTO: 2.2 10E3/UL (ref 1.6–8.3)
NEUTROPHILS NFR BLD AUTO: 28 %
NITRATE UR QL: NEGATIVE
NRBC # BLD AUTO: 0 10E3/UL
NRBC BLD AUTO-RTO: 0 /100
PH UR STRIP: 6 [PH] (ref 5–7)
PLATELET # BLD AUTO: 229 10E3/UL (ref 150–450)
RBC # BLD AUTO: 4.57 10E6/UL (ref 3.8–5.2)
RBC #/AREA URNS AUTO: ABNORMAL /HPF
SP GR UR STRIP: 1.02 (ref 1–1.03)
SQUAMOUS #/AREA URNS AUTO: ABNORMAL /LPF
UROBILINOGEN UR STRIP-ACNC: 0.2 E.U./DL
WBC # BLD AUTO: 7.9 10E3/UL (ref 4–11)
WBC #/AREA URNS AUTO: ABNORMAL /HPF

## 2024-12-04 PROCEDURE — G2211 COMPLEX E/M VISIT ADD ON: HCPCS | Performed by: STUDENT IN AN ORGANIZED HEALTH CARE EDUCATION/TRAINING PROGRAM

## 2024-12-04 PROCEDURE — 86140 C-REACTIVE PROTEIN: CPT

## 2024-12-04 PROCEDURE — 82565 ASSAY OF CREATININE: CPT

## 2024-12-04 PROCEDURE — 85652 RBC SED RATE AUTOMATED: CPT

## 2024-12-04 PROCEDURE — 36415 COLL VENOUS BLD VENIPUNCTURE: CPT

## 2024-12-04 PROCEDURE — 99213 OFFICE O/P EST LOW 20 MIN: CPT | Performed by: STUDENT IN AN ORGANIZED HEALTH CARE EDUCATION/TRAINING PROGRAM

## 2024-12-04 PROCEDURE — 85025 COMPLETE CBC W/AUTO DIFF WBC: CPT

## 2024-12-04 PROCEDURE — 99214 OFFICE O/P EST MOD 30 MIN: CPT | Performed by: STUDENT IN AN ORGANIZED HEALTH CARE EDUCATION/TRAINING PROGRAM

## 2024-12-04 PROCEDURE — 81001 URINALYSIS AUTO W/SCOPE: CPT

## 2024-12-04 PROCEDURE — 84450 TRANSFERASE (AST) (SGOT): CPT

## 2024-12-04 RX ORDER — ADALIMUMAB 40MG/0.4ML
40 KIT SUBCUTANEOUS
Qty: 2.4 ML | Refills: 1 | Status: SHIPPED | OUTPATIENT
Start: 2024-12-04

## 2024-12-04 ASSESSMENT — PAIN SCALES - GENERAL: PAINLEVEL_OUTOF10: NO PAIN (0)

## 2024-12-04 NOTE — PATIENT INSTRUCTIONS
Our plan for today is:    - Tests:    Labs today   Labs with next appointment     - Medications:    Continue on humira every 2 weeks

## 2024-12-04 NOTE — NURSING NOTE
Chief Complaint   Patient presents with    RECHECK     /73 (BP Location: Right arm, Patient Position: Sitting, Cuff Size: Adult Regular)   Pulse 85   Wt 63.5 kg (140 lb)   SpO2 98%   BMI 23.30 kg/m

## 2024-12-04 NOTE — PROGRESS NOTES
RHEUMATOLOGY OUTPATIENT CLINIC NOTE     Referring Provider:  Referring Provider: Alex Johnson MD      Lab review      RF:  Negative    CCP Ab:  Positive , 194, 217 positive     HLA B27; negative      FIOR: Positive     dsDNA:  Negative    RANDY panel:  Negative    SSA: Negative    SSB: Negative        Imaging review      X-ray right hand 11/2022: No erosions     X-ray sacroiliac joints 2014: No evidence of sacroiliitis      MRI Left wrist 2014: No acute internal derangement. No significant abnormal enhancement or bony erosion        Subjective     Visit date December 4, 2024    William is a 24 year old female who presents today for follow up.    Since the last visit,    - Workup 6/17/2024 showed:    Hemoglobin within normal limits   WBC  within normal limits   Platelets  within normal limits   ESR  within normal limits   CRP  within normal limits   AST  within normal limits   Creatinine  within normal limits     Today, William mentioned the following:    She is doing overall well  No major flares  She denies arthritis in the hands, elbows, knees, feet  She denies shortness of breath, cough, fever  She denies skin rash, oral sores, malar erythema     ROS    Current Medications   Humira injection every 2 weeks       Past Medical history   Past Medical History:   Diagnosis Date    RA (rheumatoid arthritis) (H)        Objective   /73 (BP Location: Right arm, Patient Position: Sitting, Cuff Size: Adult Regular)   Pulse 85   Wt 63.5 kg (140 lb)   SpO2 98%   BMI 23.30 kg/m        PHYSICAL EXAMINATION  Physical Exam  HENT:      Head: Normocephalic.      Mouth/Throat:      Mouth: Mucous membranes are moist.   Eyes:      Conjunctiva/sclera: Conjunctivae normal.   Pulmonary:      Effort: Pulmonary effort is normal.      Breath sounds: Normal breath sounds.   Musculoskeletal:         General: No swelling or tenderness.      Comments: No noticeable swelling in the hands (MCP, PIP, DIP), wrists, elbows, knees, ankles,  feet. Range of motion in the shoulders and hips are within accepted range for age.     SJC:0/28  TJC: 0/28    Pain scale is 0/10   Skin:     Findings: No rash.   Neurological:      Mental Status: She is alert.           Assessment & Plan     # History of juvenile rheumatoid arthritis  # CCP positive, rheumatoid factor negative rheumatoid arthritis  # FIOR positive, negative dsDNA, RANDY  # History of low C4 with normal C3    # Screening for chronic infections  # Longitudinal care     William has been evaluated by multiple rheumatologists throughout the years.  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  Family history of psoriatic arthritis in mother, maternal grandmother and aunt with rheumatoid arthritis     Treatment included: Plaquenil, methotrexate, Humira     Most recent regimen in 2024 is Humira injection every 2 weeks     # History of juvenile rheumatoid arthritis  # CCP positive, rheumatoid factor negative rheumatoid arthritis     She is doing overall well from rheumatoid arthritis standpoint.   She denies symptoms suggestive of active arthritis or recent flares  She is tolerating Humira well without notable side effects.   There is no active arthritis today on exam..   CDAI score is 0 reflecting remission.     We discussed consideration of tapering Humira down the road if she remains under good control and also we can switch Humira to Cimzia if there are plans for conception in the future.  In the meantime we will continue with Humira injection every 2 weeks    Plan:  - Get updated labs today  - Continue Humira subcutaneous injection every 2 weeks        # FIOR positive, negative dsDNA, RANDY  # History of low C4 with normal C3  No clinical symptoms suggestive of lupus.  Minimal low C4 with normal C3.  Continue to watch for now    # Mild eosinophilia on CBC  Evaluated by allergy and  immunology, likely secondary to allergic rhinitis.    # Screening for chronic infections  2024  Hepatitis B surface antigen: Negative  Hepatitis B core: Negative  Hepatitis B surface antibody: Negative   2023  Hepatitis C antibody: Negative   2022  QuantiFERON gold: Negative        # Need for vaccination  Hepatitis B surface antibody negative.  She is unsure if she had recent booster vaccination.  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    # Longitudinal care  The longitudinal plan of care for the diagnosis(es)/condition(s) as documented were addressed during this visit. Due to the added complexity in care, I will continue to support William in the subsequent management and with ongoing continuity of care.      Review of the result(s) of each unique test - as HPI  Ordering of each unique test  Prescription drug management            Return in about 6 months (around 6/4/2025) for Follow up.    Jimmie Rosales MD  [facility] RHEUMATOLOGY

## 2024-12-04 NOTE — LETTER
12/4/2024       RE: William Payne  [address]  [address]  [place] [address]     Dear Colleague,    Thank you for referring your patient, William Payne, to the [facility] RHEUMATOLOGY at [facility]. Please see a copy of my visit note below.    RHEUMATOLOGY OUTPATIENT CLINIC NOTE     Referring Provider:  Referring Provider: Alex Johnson MD      Lab review      RF:  Negative    CCP Ab:  Positive , 194, 217 positive     HLA B27; negative      FIOR: Positive     dsDNA:  Negative    RANDY panel:  Negative    SSA: Negative    SSB: Negative        Imaging review      X-ray right hand 11/2022: No erosions     X-ray sacroiliac joints 2014: No evidence of sacroiliitis      MRI Left wrist 2014: No acute internal derangement. No significant abnormal enhancement or bony erosion        Subjective    Visit date December 4, 2024    William is a 24 year old female who presents today for follow up.    Since the last visit,    - Workup 6/17/2024 showed:    Hemoglobin within normal limits   WBC  within normal limits   Platelets  within normal limits   ESR  within normal limits   CRP  within normal limits   AST  within normal limits   Creatinine  within normal limits     Today, William mentioned the following:    She is doing overall well  No major flares  She denies arthritis in the hands, elbows, knees, feet  She denies shortness of breath, cough, fever  She denies skin rash, oral sores, malar erythema     ROS    Current Medications   Humira injection every 2 weeks       Past Medical history   Past Medical History:   Diagnosis Date     RA (rheumatoid arthritis) (H)        Objective  /73 (BP Location: Right arm, Patient Position: Sitting, Cuff Size: Adult Regular)   Pulse 85   Wt 63.5 kg (140 lb)   SpO2 98%   BMI 23.30 kg/m        PHYSICAL EXAMINATION  Physical Exam  HENT:      Head: Normocephalic.      Mouth/Throat:      Mouth: Mucous membranes are moist.    Eyes:      Conjunctiva/sclera: Conjunctivae normal.   Pulmonary:      Effort: Pulmonary effort is normal.      Breath sounds: Normal breath sounds.   Musculoskeletal:         General: No swelling or tenderness.      Comments: No noticeable swelling in the hands (MCP, PIP, DIP), wrists, elbows, knees, ankles, feet. Range of motion in the shoulders and hips are within accepted range for age.     SJC:0/28  TJC: 0/28    Pain scale is 0/10   Skin:     Findings: No rash.   Neurological:      Mental Status: She is alert.           Assessment & Plan    # History of juvenile rheumatoid arthritis  # CCP positive, rheumatoid factor negative rheumatoid arthritis  # FIOR positive, negative dsDNA, RANDY  # History of low C4 with normal C3    # Screening for chronic infections  # Longitudinal care     William has been evaluated by multiple rheumatologists throughout the years.  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  Family history of psoriatic arthritis in mother, maternal grandmother and aunt with rheumatoid arthritis     Treatment included: Plaquenil, methotrexate, Humira     Most recent regimen in 2024 is Humira injection every 2 weeks     # History of juvenile rheumatoid arthritis  # CCP positive, rheumatoid factor negative rheumatoid arthritis     She is doing overall well from rheumatoid arthritis standpoint.   She denies symptoms suggestive of active arthritis or recent flares  She is tolerating Humira well without notable side effects.   There is no active arthritis today on exam..   CDAI score is 0 reflecting remission.     We discussed consideration of tapering Humira down the road if she remains under good control and also we can switch Humira to Cimzia if there are plans for conception in the future.  In the meantime we will continue with Humira injection every 2 weeks    Plan:  - Get  updated labs today  - Continue Humira subcutaneous injection every 2 weeks        # FIOR positive, negative dsDNA, RANDY  # History of low C4 with normal C3  No clinical symptoms suggestive of lupus.  Minimal low C4 with normal C3.  Continue to watch for now    # Mild eosinophilia on CBC  Evaluated by allergy and immunology, likely secondary to allergic rhinitis.    # Screening for chronic infections  2024  Hepatitis B surface antigen: Negative  Hepatitis B core: Negative  Hepatitis B surface antibody: Negative   2023  Hepatitis C antibody: Negative   2022  QuantiFERON gold: Negative        # Need for vaccination  Hepatitis B surface antibody negative.  She is unsure if she had recent booster vaccination.  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    # Longitudinal care  The longitudinal plan of care for the diagnosis(es)/condition(s) as documented were addressed during this visit. Due to the added complexity in care, I will continue to support William in the subsequent management and with ongoing continuity of care.      Review of the result(s) of each unique test - as HPI  Ordering of each unique test  Prescription drug management            Return in about 6 months (around 6/4/2025) for Follow up.    Jimmie Rosales MD  [facility] RHEUMATOLOGY      Again, thank you for allowing me to participate in the care of your patient.      Sincerely,    Jimmie Rosales MD

## 2024-12-18 ENCOUNTER — TELEPHONE (OUTPATIENT)
Dept: RHEUMATOLOGY | Facility: CLINIC | Age: 24
End: 2024-12-18
Payer: COMMERCIAL

## 2024-12-18 NOTE — TELEPHONE ENCOUNTER
MTM referral from: [facility] visit (referral by provider)    MTM referral outreach attempt #2 on December 18, 2024 at 11:43 AM      Outcome: Patient not reachable after several attempts, routed to Pharmacist Team/Provider as an [employer] Message Sent    [facility]

## 2024-12-21 ENCOUNTER — MYC MEDICAL ADVICE (OUTPATIENT)
Dept: RHEUMATOLOGY | Facility: CLINIC | Age: 24
End: 2024-12-21
Payer: COMMERCIAL

## 2024-12-21 DIAGNOSIS — M05.79 RHEUMATOID ARTHRITIS INVOLVING MULTIPLE SITES WITH POSITIVE RHEUMATOID FACTOR (H): ICD-10-CM

## 2024-12-23 RX ORDER — PREDNISONE 5 MG/1
TABLET ORAL
Qty: 18 TABLET | Refills: 1 | Status: SHIPPED | OUTPATIENT
Start: 2024-12-23

## 2024-12-23 NOTE — TELEPHONE ENCOUNTER
Patient requesting prednisone taper for flare that has been going on for appx 2 weeks. She reports her right sternoclavicular joint, right shoulder, right elbow, left wrist, and bilateral groin/hip are the affected areas. She states she has been using ibuprofen which helps to take the edge off but she reports a feeling of inflammation when it wears off and that she has been needing to ice frequently. Prednisone taper patient has had in the past pended for review and sign by provider.     Ilene Thomas RN

## 2025-02-11 ENCOUNTER — MYC MEDICAL ADVICE (OUTPATIENT)
Dept: PEDIATRICS | Facility: CLINIC | Age: 25
End: 2025-02-11
Payer: COMMERCIAL

## 2025-02-11 NOTE — TELEPHONE ENCOUNTER
"See patient's MyChart message   - Patient wondering how long it take for a Pap Smear to process     Upon chart review:   - Patient had a Pap Smear completed on 2/7/2025   - 2/7/2025 Pap Smear is in process at this time     Per Clinical References: \"It usually takes a week or two to get the results back.\"     Sent a MyChart message to the patient   - Informed the patient that it usually takes a week or two to get the results back from a pap smear     Ellen RUSSELL RN   [facility]       "

## 2025-02-17 PROBLEM — N87.0 MILD DYSPLASIA OF CERVIX: Status: ACTIVE | Noted: 2022-12-28

## 2025-02-19 ENCOUNTER — PATIENT OUTREACH (OUTPATIENT)
Dept: PEDIATRICS | Facility: CLINIC | Age: 25
End: 2025-02-19
Payer: COMMERCIAL

## 2025-04-24 ENCOUNTER — TELEPHONE (OUTPATIENT)
Dept: RHEUMATOLOGY | Facility: CLINIC | Age: 25
End: 2025-04-24
Payer: COMMERCIAL

## 2025-04-24 NOTE — TELEPHONE ENCOUNTER
PA Initiation    Medication: HUMIRA (2 PEN) 40 MG/0.4ML SC AJKT  Insurance Company: [employer] - Phone [phone] Fax [phone]  Pharmacy Filling the Rx:    Filling Pharmacy Phone:    Filling Pharmacy Fax:    Start Date: 4/24/2025            Key: [id number]      ANNA Santiago, [facility]  Specialty Pharmacy Clinic [facility]    [email or web address]     Phone: [phone]  Fax: [phone]

## 2025-05-29 ENCOUNTER — LAB (OUTPATIENT)
Dept: LAB | Facility: CLINIC | Age: 25
End: 2025-05-29
Payer: COMMERCIAL

## 2025-05-29 DIAGNOSIS — M05.79 RHEUMATOID ARTHRITIS INVOLVING MULTIPLE SITES WITH POSITIVE RHEUMATOID FACTOR (H): ICD-10-CM

## 2025-05-29 LAB
ALBUMIN UR-MCNC: 30 MG/DL
APPEARANCE UR: CLEAR
AST SERPL W P-5'-P-CCNC: 29 U/L (ref 0–45)
BACTERIA #/AREA URNS HPF: ABNORMAL /HPF
BASOPHILS # BLD AUTO: 0 10E3/UL (ref 0–0.2)
BASOPHILS NFR BLD AUTO: 1 %
BILIRUB UR QL STRIP: NEGATIVE
COLOR UR AUTO: YELLOW
CREAT SERPL-MCNC: 0.98 MG/DL (ref 0.51–0.95)
CRP SERPL-MCNC: <3 MG/L
EGFRCR SERPLBLD CKD-EPI 2021: 81 ML/MIN/1.73M2
EOSINOPHIL # BLD AUTO: 0.4 10E3/UL (ref 0–0.7)
EOSINOPHIL NFR BLD AUTO: 6 %
ERYTHROCYTE [DISTWIDTH] IN BLOOD BY AUTOMATED COUNT: 11.8 % (ref 10–15)
ERYTHROCYTE [SEDIMENTATION RATE] IN BLOOD BY WESTERGREN METHOD: 11 MM/HR (ref 0–20)
GLUCOSE UR STRIP-MCNC: NEGATIVE MG/DL
HCT VFR BLD AUTO: 40.1 % (ref 35–47)
HGB BLD-MCNC: 14.1 G/DL (ref 11.7–15.7)
HGB UR QL STRIP: NEGATIVE
IMM GRANULOCYTES # BLD: 0 10E3/UL
IMM GRANULOCYTES NFR BLD: 0 %
KETONES UR STRIP-MCNC: NEGATIVE MG/DL
LEUKOCYTE ESTERASE UR QL STRIP: NEGATIVE
LYMPHOCYTES # BLD AUTO: 3.6 10E3/UL (ref 0.8–5.3)
LYMPHOCYTES NFR BLD AUTO: 55 %
MCH RBC QN AUTO: 31.3 PG (ref 26.5–33)
MCHC RBC AUTO-ENTMCNC: 35.2 G/DL (ref 31.5–36.5)
MCV RBC AUTO: 89 FL (ref 78–100)
MONOCYTES # BLD AUTO: 0.4 10E3/UL (ref 0–1.3)
MONOCYTES NFR BLD AUTO: 6 %
NEUTROPHILS # BLD AUTO: 2.2 10E3/UL (ref 1.6–8.3)
NEUTROPHILS NFR BLD AUTO: 33 %
NITRATE UR QL: NEGATIVE
PH UR STRIP: 6.5 [PH] (ref 5–7)
PLATELET # BLD AUTO: 240 10E3/UL (ref 150–450)
RBC # BLD AUTO: 4.51 10E6/UL (ref 3.8–5.2)
RBC #/AREA URNS AUTO: ABNORMAL /HPF
SP GR UR STRIP: 1.02 (ref 1–1.03)
SQUAMOUS #/AREA URNS AUTO: ABNORMAL /LPF
UROBILINOGEN UR STRIP-ACNC: 0.2 E.U./DL
WBC # BLD AUTO: 6.6 10E3/UL (ref 4–11)
WBC #/AREA URNS AUTO: ABNORMAL /HPF

## 2025-06-08 DIAGNOSIS — Z79.899 HIGH RISK MEDICATION USE: Primary | ICD-10-CM

## 2025-06-10 ENCOUNTER — TELEPHONE (OUTPATIENT)
Dept: RHEUMATOLOGY | Facility: CLINIC | Age: 25
End: 2025-06-10
Payer: COMMERCIAL

## 2025-06-10 DIAGNOSIS — M05.79 RHEUMATOID ARTHRITIS INVOLVING MULTIPLE SITES WITH POSITIVE RHEUMATOID FACTOR (H): ICD-10-CM

## 2025-06-10 DIAGNOSIS — R76.8 CYCLIC CITRULLINATED PEPTIDE (CCP) ANTIBODY POSITIVE: ICD-10-CM

## 2025-06-11 RX ORDER — ADALIMUMAB 40MG/0.4ML
40 KIT SUBCUTANEOUS
Qty: 2.4 ML | Refills: 1 | Status: SHIPPED | OUTPATIENT
Start: 2025-06-11

## 2025-06-11 NOTE — TELEPHONE ENCOUNTER
Last Written Prescription:      Disp Refills Start End DIANNA   Adalimumab (HUMIRA, 2 PEN,) 40 MG/0.4ML pen kit 2.4 mL 1 12/4/2024 -- No   Sig - Route: Inject 0.4 mLs (40 mg) subcutaneously every 14 days. - Subcutaneous     ----------------------  Last Visit Date: 12/4/24 Bobby  Future Visit Date: None/ 6 months  ----------------------  Chart review: 4/29/25 Prior Authorization Approval     Medication: HUMIRA (2 PEN) 40 MG/0.4ML SC AJKT  Authorization Effective Date: 4/29/2025  Authorization Expiration Date: 4/27/2026    Refill decision: Medication refilled per  Medication Refill in Ambulatory Care  policy.    Scheduling has been notified to contact the pt for appointment.          Request from pharmacy:  Requested Prescriptions   Pending Prescriptions Disp Refills    Adalimumab (HUMIRA (2 PEN)) 40 MG/0.4ML pen kit 2.4 mL 1     Sig: Inject 0.4 mLs (40 mg) subcutaneously every 14 days.       There is no refill protocol information for this order

## 2025-06-25 ENCOUNTER — VIRTUAL VISIT (OUTPATIENT)
Dept: PEDIATRICS | Facility: CLINIC | Age: 25
End: 2025-06-25
Payer: COMMERCIAL

## 2025-06-25 DIAGNOSIS — M26.623 BILATERAL TEMPOROMANDIBULAR JOINT PAIN: Primary | ICD-10-CM

## 2025-06-25 DIAGNOSIS — Z12.83 SKIN CANCER SCREENING: ICD-10-CM

## 2025-06-25 PROCEDURE — 98013 SYNCH AUDIO-ONLY EST LOW 20: CPT | Performed by: PHYSICIAN ASSISTANT

## 2025-06-25 NOTE — PROGRESS NOTES
William is a 24 year old who is being evaluated via a billable telephone visit.      Originating Location (pt. Location): Home    Distant Location (provider location):  On-site  Telephone visit completed due to the patient did not have access to video, while the distant provider did.    Assessment & Plan     Bilateral temporomandibular joint pain  Failed conservative management with PT, . Having increase in symptoms would like consult for possible botox tx.   - Pain Management  Referral      Skin Cancer screening  - referral to derm  - see patient instructions for skin cancer screen and protection            Subjective   William is a 24 year old, presenting for the following health issues:  No chief complaint on file.    HPI          Phone visit to discuss if she can get Botox for TMJ  She did PT.   Had tried   Lingering symptoms   Right worse then left  Hurts to touch. Gets some headaches. Hurts with movement  Inner ear pain- had it evaluated no ear symptoms  Is starting to click, used to click and get stuck    Would like referral to derm  Many freckles/moles  Would like screening  No hx   No skin changes                 Objective           Vitals:  No vitals were obtained today due to virtual visit.    Physical Exam   General: Alert and no distress //Respiratory: No audible wheeze, cough, or shortness of breath // Psychiatric:  Appropriate affect, tone, and pace of words            Phone call duration: 11 minutes  Signed Electronically by: Heather Miguel PA-C

## 2025-06-26 ENCOUNTER — PATIENT OUTREACH (OUTPATIENT)
Dept: CARE COORDINATION | Facility: CLINIC | Age: 25
End: 2025-06-26
Payer: COMMERCIAL

## 2025-06-26 ENCOUNTER — LAB (OUTPATIENT)
Dept: LAB | Facility: CLINIC | Age: 25
End: 2025-06-26
Attending: STUDENT IN AN ORGANIZED HEALTH CARE EDUCATION/TRAINING PROGRAM
Payer: COMMERCIAL

## 2025-06-26 ENCOUNTER — RESULTS FOLLOW-UP (OUTPATIENT)
Dept: RHEUMATOLOGY | Facility: CLINIC | Age: 25
End: 2025-06-26

## 2025-06-26 ENCOUNTER — OFFICE VISIT (OUTPATIENT)
Dept: RHEUMATOLOGY | Facility: CLINIC | Age: 25
End: 2025-06-26
Attending: STUDENT IN AN ORGANIZED HEALTH CARE EDUCATION/TRAINING PROGRAM
Payer: COMMERCIAL

## 2025-06-26 ENCOUNTER — PATIENT OUTREACH (OUTPATIENT)
Dept: CARE COORDINATION | Facility: CLINIC | Age: 25
End: 2025-06-26

## 2025-06-26 VITALS
HEART RATE: 74 BPM | BODY MASS INDEX: 22.3 KG/M2 | OXYGEN SATURATION: 99 % | WEIGHT: 134 LBS | DIASTOLIC BLOOD PRESSURE: 80 MMHG | SYSTOLIC BLOOD PRESSURE: 122 MMHG

## 2025-06-26 DIAGNOSIS — M05.79 RHEUMATOID ARTHRITIS INVOLVING MULTIPLE SITES WITH POSITIVE RHEUMATOID FACTOR (H): ICD-10-CM

## 2025-06-26 DIAGNOSIS — Z79.899 HIGH RISK MEDICATION USE: ICD-10-CM

## 2025-06-26 DIAGNOSIS — N17.9 AKI (ACUTE KIDNEY INJURY): ICD-10-CM

## 2025-06-26 DIAGNOSIS — Z79.899 HIGH RISK MEDICATION USE: Primary | ICD-10-CM

## 2025-06-26 DIAGNOSIS — M25.50 ARTHRALGIA, UNSPECIFIED JOINT: ICD-10-CM

## 2025-06-26 DIAGNOSIS — R76.8 CYCLIC CITRULLINATED PEPTIDE (CCP) ANTIBODY POSITIVE: ICD-10-CM

## 2025-06-26 LAB
ALBUMIN UR-MCNC: NEGATIVE MG/DL
APPEARANCE UR: CLEAR
AST SERPL W P-5'-P-CCNC: 28 U/L (ref 0–45)
BACTERIA #/AREA URNS HPF: ABNORMAL /HPF
BASOPHILS # BLD AUTO: 0.1 10E3/UL (ref 0–0.2)
BASOPHILS NFR BLD AUTO: 1 %
BILIRUB UR QL STRIP: NEGATIVE
COLOR UR AUTO: ABNORMAL
CREAT SERPL-MCNC: 1.04 MG/DL (ref 0.51–0.95)
CRP SERPL-MCNC: <3 MG/L
EGFRCR SERPLBLD CKD-EPI 2021: 76 ML/MIN/1.73M2
ELLIPTOCYTES BLD QL SMEAR: SLIGHT
EOSINOPHIL # BLD AUTO: 0.3 10E3/UL (ref 0–0.7)
EOSINOPHIL NFR BLD AUTO: 3 %
ERYTHROCYTE [DISTWIDTH] IN BLOOD BY AUTOMATED COUNT: 11.9 % (ref 10–15)
ERYTHROCYTE [SEDIMENTATION RATE] IN BLOOD BY WESTERGREN METHOD: 21 MM/HR (ref 0–20)
GLUCOSE UR STRIP-MCNC: NEGATIVE MG/DL
HCT VFR BLD AUTO: 38.9 % (ref 35–47)
HGB BLD-MCNC: 13.7 G/DL (ref 11.7–15.7)
HGB UR QL STRIP: NEGATIVE
IMM GRANULOCYTES # BLD: 0 10E3/UL
IMM GRANULOCYTES NFR BLD: 0 %
KETONES UR STRIP-MCNC: NEGATIVE MG/DL
LEUKOCYTE ESTERASE UR QL STRIP: NEGATIVE
LYMPHOCYTES # BLD AUTO: 5.3 10E3/UL (ref 0.8–5.3)
LYMPHOCYTES NFR BLD AUTO: 56 %
MCH RBC QN AUTO: 31.8 PG (ref 26.5–33)
MCHC RBC AUTO-ENTMCNC: 35.2 G/DL (ref 31.5–36.5)
MCV RBC AUTO: 90 FL (ref 78–100)
MONOCYTES # BLD AUTO: 0.6 10E3/UL (ref 0–1.3)
MONOCYTES NFR BLD AUTO: 6 %
NEUTROPHILS # BLD AUTO: 3.3 10E3/UL (ref 1.6–8.3)
NEUTROPHILS NFR BLD AUTO: 34 %
NITRATE UR QL: NEGATIVE
NRBC # BLD AUTO: 0 10E3/UL
NRBC BLD AUTO-RTO: 0 /100
PH UR STRIP: 6.5 [PH] (ref 5–7)
PLAT MORPH BLD: ABNORMAL
PLATELET # BLD AUTO: 211 10E3/UL (ref 150–450)
RBC # BLD AUTO: 4.31 10E6/UL (ref 3.8–5.2)
RBC MORPH BLD: ABNORMAL
RBC URINE: <1 /HPF
SP GR UR STRIP: 1 (ref 1–1.03)
SQUAMOUS EPITHELIAL: <1 /HPF
UROBILINOGEN UR STRIP-MCNC: NORMAL MG/DL
WBC # BLD AUTO: 9.6 10E3/UL (ref 4–11)
WBC URINE: 1 /HPF

## 2025-06-26 PROCEDURE — 36415 COLL VENOUS BLD VENIPUNCTURE: CPT

## 2025-06-26 PROCEDURE — 82565 ASSAY OF CREATININE: CPT

## 2025-06-26 PROCEDURE — 85652 RBC SED RATE AUTOMATED: CPT

## 2025-06-26 PROCEDURE — 86140 C-REACTIVE PROTEIN: CPT

## 2025-06-26 PROCEDURE — 84450 TRANSFERASE (AST) (SGOT): CPT

## 2025-06-26 PROCEDURE — 99213 OFFICE O/P EST LOW 20 MIN: CPT | Performed by: STUDENT IN AN ORGANIZED HEALTH CARE EDUCATION/TRAINING PROGRAM

## 2025-06-26 PROCEDURE — 85004 AUTOMATED DIFF WBC COUNT: CPT

## 2025-06-26 PROCEDURE — 81001 URINALYSIS AUTO W/SCOPE: CPT

## 2025-06-26 RX ORDER — ADALIMUMAB 40MG/0.4ML
40 KIT SUBCUTANEOUS
Qty: 2.4 ML | Refills: 1 | OUTPATIENT
Start: 2025-06-26

## 2025-06-26 ASSESSMENT — PAIN SCALES - GENERAL: PAINLEVEL_OUTOF10: NO PAIN (0)

## 2025-06-26 NOTE — PROGRESS NOTES
RHEUMATOLOGY OUTPATIENT CLINIC NOTE     Referring Provider:  Referring Provider: Alex Johnson MD      Lab review      RF:  Negative    CCP Ab:  Positive , 194, 217 positive     HLA B27; negative      FIOR: Positive     dsDNA:  Negative    RANDY panel:  Negative    SSA: Negative    SSB: Negative        Imaging review      X-ray right hand 11/2022: No erosions     X-ray sacroiliac joints 2014: No evidence of sacroiliitis      MRI Left wrist 2014: No acute internal derangement. No significant abnormal enhancement or bony erosion        Subjective     Visit date  June 26, 2025    William is a 24 year old female who presents today for follow up.    Since the last visit,    - Workup 5/29/2024 showed:    Hemoglobin within normal limits   WBC  within normal limits   Platelets  within normal limits   ESR  within normal limits   CRP  within normal limits   AST  within normal limits   Creatinine  mildly elevated     URINE STUDIES  Recent Labs   Lab Test 05/29/25  0909 12/04/24  0859 06/17/24  1048 05/17/22  1029   UBLD Negative Negative Negative Negative   URINEPH 6.5 6.0 6.0 8.0*   PROTEIN 30* Negative Negative Negative   NITRITE Negative Negative Negative Negative   LEUKEST Negative Negative Negative Small*   RBCU 0-2 None Seen 0-2 0-2   WBCU 0-5 0-5 0-5 10-25*        Today, William mentioned the following:    She is doing overall well    No major flares recently     She denies arthritis in the hands, elbows, knees, feet    She denies shortness of breath, cough, fever    She denies skin rash, oral sores, malar erythema     ROS    Current Medications   Humira injection every 2 weeks       Past Medical history   Past Medical History:   Diagnosis Date    RA (rheumatoid arthritis) (H)        Objective   There were no vitals taken for this visit.      PHYSICAL EXAMINATION  Physical Exam  HENT:      Head: Normocephalic.      Mouth/Throat:      Mouth: Mucous membranes are moist.   Eyes:      Conjunctiva/sclera: Conjunctivae  normal.   Pulmonary:      Effort: Pulmonary effort is normal.      Breath sounds: Normal breath sounds.   Musculoskeletal:         General: No swelling or tenderness.      Comments: No noticeable swelling in the hands (MCP, PIP, DIP), wrists, elbows, knees, ankles, feet. Range of motion in the shoulders and hips are within accepted range for age.     SJC:0/28  TJC: 0/28    Pain scale is 0/10   Skin:     Findings: No rash.   Neurological:      Mental Status: She is alert.           Assessment & Plan     # History of juvenile rheumatoid arthritis  # CCP positive, rheumatoid factor negative rheumatoid arthritis  # FIOR positive, negative dsDNA, RANDY  # History of low C4 with normal C3    # Screening for chronic infections  # Longitudinal care     William has been evaluated by multiple rheumatologists throughout the years.  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  Family history of psoriatic arthritis in mother, maternal grandmother and aunt with rheumatoid arthritis     Treatment included: Plaquenil, methotrexate, Humira     Most recent regimen in 2024 is Humira injection every 2 weeks     # History of juvenile rheumatoid arthritis  # CCP positive, rheumatoid factor negative rheumatoid arthritis  # Arthralgia     She is doing overall well from rheumatoid arthritis standpoint.     She denies symptoms suggestive of active arthritis or recent flares    She is tolerating Humira well without notable side effects.     There is no active arthritis today on exam..     CDAI score is 0 reflecting remission.       Plan:    - Get updated labs today    - Continue Humira subcutaneous injection every 2 weeks     # Elevated creatinine  # Urinalysis with minimal proteins   She is taking creatine supplements   Recommended adequate hydration  Repeat labs today     # FIOR positive, negative dsDNA, RANDY  #  History of low C4 with normal C3  No clinical symptoms suggestive of lupus.    Minimal low C4 with normal C3.  Continue to watch for now    # Mild eosinophilia on CBC, resolved  Evaluated by allergy and immunology, likely secondary to allergic rhinitis.  Resolved on follow up    # Screening for chronic infections  2024  Hepatitis B surface antigen: Negative  Hepatitis B core: Negative  Hepatitis B surface antibody: Negative   2023  Hepatitis C antibody: Negative   2022  QuantiFERON gold: Negative        # Longitudinal care  The longitudinal plan of care for the diagnosis(es)/condition(s) as documented were addressed during this visit. Due to the added complexity in care, I will continue to support William in the subsequent management and with ongoing continuity of care.      Review of the result(s) of each unique test - as HPI  Ordering of each unique test  Prescription drug management        No follow-ups on file.    Jimmie Rosales MD  [facility] RHEUMATOLOGY

## 2025-06-26 NOTE — PATIENT INSTRUCTIONS
Our plan for today is:    - Tests:    - Labs today (urinalysis, creatinine)    - Labs 1 week before the next appointment     - Medications:    Continue Humira injection once every 6 months

## 2025-06-26 NOTE — LETTER
6/26/2025       RE: William Payne  [address]  [place] [address]     Dear Colleague,    Thank you for referring your patient, William Payne, to the [facility] RHEUMATOLOGY at [facility]. Please see a copy of my visit note below.    RHEUMATOLOGY OUTPATIENT CLINIC NOTE     Referring Provider:  Referring Provider: Alex Johnson MD      Lab review      RF:  Negative    CCP Ab:  Positive , 194, 217 positive     HLA B27; negative      FIOR: Positive     dsDNA:  Negative    RANDY panel:  Negative    SSA: Negative    SSB: Negative        Imaging review      X-ray right hand 11/2022: No erosions     X-ray sacroiliac joints 2014: No evidence of sacroiliitis      MRI Left wrist 2014: No acute internal derangement. No significant abnormal enhancement or bony erosion        Subjective    Visit date  June 26, 2025    William is a 24 year old female who presents today for follow up.    Since the last visit,    - Workup 5/29/2024 showed:    Hemoglobin within normal limits   WBC  within normal limits   Platelets  within normal limits   ESR  within normal limits   CRP  within normal limits   AST  within normal limits   Creatinine  mildly elevated     URINE STUDIES  Recent Labs   Lab Test 05/29/25  0909 12/04/24  0859 06/17/24  1048 05/17/22  1029   UBLD Negative Negative Negative Negative   URINEPH 6.5 6.0 6.0 8.0*   PROTEIN 30* Negative Negative Negative   NITRITE Negative Negative Negative Negative   LEUKEST Negative Negative Negative Small*   RBCU 0-2 None Seen 0-2 0-2   WBCU 0-5 0-5 0-5 10-25*        Today, William mentioned the following:    She is doing overall well    No major flares recently     She denies arthritis in the hands, elbows, knees, feet    She denies shortness of breath, cough, fever    She denies skin rash, oral sores, malar erythema     ROS    Current Medications   Humira injection every 2 weeks       Past Medical history    Past Medical History:   Diagnosis Date     RA (rheumatoid arthritis) (H)        Objective  There were no vitals taken for this visit.      PHYSICAL EXAMINATION  Physical Exam  HENT:      Head: Normocephalic.      Mouth/Throat:      Mouth: Mucous membranes are moist.   Eyes:      Conjunctiva/sclera: Conjunctivae normal.   Pulmonary:      Effort: Pulmonary effort is normal.      Breath sounds: Normal breath sounds.   Musculoskeletal:         General: No swelling or tenderness.      Comments: No noticeable swelling in the hands (MCP, PIP, DIP), wrists, elbows, knees, ankles, feet. Range of motion in the shoulders and hips are within accepted range for age.     SJC:0/28  TJC: 0/28    Pain scale is 0/10   Skin:     Findings: No rash.   Neurological:      Mental Status: She is alert.           Assessment & Plan    # History of juvenile rheumatoid arthritis  # CCP positive, rheumatoid factor negative rheumatoid arthritis  # FIOR positive, negative dsDNA, RANDY  # History of low C4 with normal C3    # Screening for chronic infections  # Longitudinal care     William has been evaluated by multiple rheumatologists throughout the years.  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  Family history of psoriatic arthritis in mother, maternal grandmother and aunt with rheumatoid arthritis     Treatment included: Plaquenil, methotrexate, Humira     Most recent regimen in 2024 is Humira injection every 2 weeks     # History of juvenile rheumatoid arthritis  # CCP positive, rheumatoid factor negative rheumatoid arthritis  # Arthralgia     She is doing overall well from rheumatoid arthritis standpoint.     She denies symptoms suggestive of active arthritis or recent flares    She is tolerating Humira well without notable side effects.     There is no active arthritis today on exam..     CDAI score is 0  reflecting remission.       Plan:    - Get updated labs today    - Continue Humira subcutaneous injection every 2 weeks     # Elevated creatinine  # Urinalysis with minimal proteins   She is taking creatine supplements   Recommended adequate hydration  Repeat labs today     # FIOR positive, negative dsDNA, RANDY  # History of low C4 with normal C3  No clinical symptoms suggestive of lupus.    Minimal low C4 with normal C3.  Continue to watch for now    # Mild eosinophilia on CBC, resolved  Evaluated by allergy and immunology, likely secondary to allergic rhinitis.  Resolved on follow up    # Screening for chronic infections  2024  Hepatitis B surface antigen: Negative  Hepatitis B core: Negative  Hepatitis B surface antibody: Negative   2023  Hepatitis C antibody: Negative   2022  QuantiFERON gold: Negative        # Longitudinal care  The longitudinal plan of care for the diagnosis(es)/condition(s) as documented were addressed during this visit. Due to the added complexity in care, I will continue to support William in the subsequent management and with ongoing continuity of care.      Review of the result(s) of each unique test - as HPI  Ordering of each unique test  Prescription drug management        No follow-ups on file.    Jimmie Rosales MD  [facility] RHEUMATOLOGY    Again, thank you for allowing me to participate in the care of your patient.      Sincerely,    Jimmie Rosales MD

## 2025-06-26 NOTE — NURSING NOTE
Chief Complaint   Patient presents with    RECHECK       Cyclic citrullinated peptide (CCP) antibody positive    Rheumatoid arthritis involving multiple sites with positive rheumatoid factor (H)       /80 (BP Location: Right arm, Patient Position: Sitting, Cuff Size: Adult Regular)   Pulse 74   Wt 60.8 kg (134 lb)   SpO2 99%   BMI 22.30 kg/m

## 2025-06-30 ENCOUNTER — PATIENT OUTREACH (OUTPATIENT)
Dept: CARE COORDINATION | Facility: CLINIC | Age: 25
End: 2025-06-30
Payer: COMMERCIAL

## 2025-07-22 NOTE — PROGRESS NOTES
"William Payne is a 24 year old female     This patient is being seen in consultation for evaluation of pain issues and recommendations for management. They are referred by Heather Miguel PA-C from the primary care clinic for the following pain condition: TMJ pain, failed PT, . questioning Botox     Current controlled substance medications, if any, are being prescribed by N/A.    {Rooming staff  Please complete the PEG Assessment  Assess Pain, Function, and Quality of Life. Complete every pain visit :[id number]}       No data to display                    7/24/2025     8:38 AM   CATHY-7 SCORE   Total Score 0 (minimal anxiety)   Total Score 0        Patient-reported       Primary Care Provider is Heather Miguel      CHIEF COMPLAINT:  Jaw pain    HISTORY OF PRESENT ILLNESS:  William Payne is a 24 year old female with history of facial pain, jaw pain, and Juvenile Rheumatoid arthritis.  Family Hx of Psoriatic Arthritis, Rheumatoid Arthritis.   Onset/Progression: Thought she was having ear pain for a long time but eventually was thought to be r/t RA, possibly TMJ. Urgent gave her Prednisone which did not help. Has done PT for her jaw, deep massage, used a mouth guard, has used a mouth guard. Had an ENT evaluation and hearing.ears is not issues. Dentist has not said anything from their stand point. Deep massage helps loosen but sort lived relief. Ice packs & heat not helpful. RA is well controlled on Humira. Knows her pain triggers, takes occasional Ibuprofen for flare but if she gets enough sleep, routine is ok   Starting to have headachces.   Pain quality: Aching, Throbbing, and dull and pressure    Pain timing: Constant    Pain score today: Mild Pain (2)   Pain rating: intensity ranges from 0/10 to 2/10, and averages 2/10 on a 0-10 scale.  Aggravating factors include: \"nothing\", doesn't chew gum but otherwise, nothin makes it worse   Relieving factors include: \"nothing\"    TMJ Evaluation.   -\"Locking\" of " jaw in one position: No  - Scraping sensation in the joint: No  - Clicking or clunking with jaw movement: Will make a clicking sound in AM and occasionally throughout the day.   - Jaw stiffness: Pain bilateral but right side clicks more. Used to have to massage jaw in get it to open   - Difficulty chewing hard foods: doesn't chew gum but no other restrictions  - Changes in the way your teeth are aligned. None, wore braces in teens   - Sore muscles in your cheeks: yes   - Swelling at the TMJ: yes         Headache History:   Initial cause of headaches if known: Jaw pain   Age of onset: jaw pain started 1 year ago, HA just last few months   Frequency and Timing of Headaches: Mild HA 2+ x/week. Migraine type HA x2 recently   Symptoms:   Aura:  No  Photophobia:  Yes  Phonophobia:  Yes  Nausea:  Yes  Vomiting:  No  Description of HA: starts across whole forehead, does not move across whole head  Unilateral or full head pain: Forehead only   Previous medications: Ibuprofen: H  Previous therapies: None, PT for jaw pain but not HAs      Past Pain Treatments:   Pain Clinic:     NO  Physical therapy: Yes  SW, good education about condition but didn't help pain   Exercise: Yes, NH  Psychologist: Yes Has a therapist, has talked with her about pain.  Chiropractor: No   Acupuncture: No   Pharmacotherapy:    Opioids: Yes     Non-opioids:  No  TENs Unit/electric stim: Yes for other pain issue  Injections/clinic procedures: No   Surgeries related to pain: No     Annual Requirements last collected:  n/a    Current Pain Relevant Medications:    Humira injections q2 weeks  Prednisone PRN  Occasional Ibuprofen   Current Controlled Substance Medications:   None    Previous Pain Relevant Medications: (H--helped; HI--Helped initially; SWH--Somewhat helpful; NH--No help; W--worse; SE--side effects; ?--Unsure if helpful)   Opiates: none   NSAIDS: Ibuprofen: H   Migraine medications: None  Muscle Relaxants: Methocarbamol: H  Neuropathics:  none  Anti-depressants for pain: none   Anxiety medications: none   Topicals: lidocaine gel; NH   OTC medications: none   Sleep Medications: none   Other medications not covered above: Plaquenil, Methotrexate, Humira, Prednisone: [facility] for RA, not for jaw pain    SUBSTANCE HISTORY:   Past or current illegal drug use: none   Past or current ETOH use: rare, celebrations   Nicotine/tobacco use: none   Daily Caffeine intake: 1 per day     CURRENT FAMILY/SOCIAL SITUATION:  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 N/A   Activity: Is patient functional and able to perform Activities of Daily Living? N/A   Adverse effects: Is patient free from adverse side effects from opiates? N/A   Adherence to Rx protocol: Is patient adhering to Controlled Substance Agreement and taking medications ONLY as ordered? N/A    Is Narcan prescribed for opiate use >50 MME daily or concurrent use of opiates and benzodiazepines? N/A    [place] Board of Pharmacy Data Base Reviewed:    YES; As expected, no concern for misuse/abuse of controlled medications based on this report. Reviewed [facility] July 22, 2025- no concerning fills.    PHYSICAL EXAM    /76   Pulse 74   Wt 59 kg (130 lb)   SpO2 100%   BMI 21.63 kg/m       Appearance:   A&O. Patient is appropriate.   Patient is in NAD.      Jaw Physical Exam:   Open and close good ROM, load clicking sound with opening wide, no click felt externally, mild swelling noted at TM joints bilaterally       DIRE Score for ongoing opioid management is calculated as follows:    Diagnosis = 2 pts (slowly progressive; moderate pain/objective findings)    Intractability = 3 pts (patient fully engaged but inadequate response to  treatments)    Risk        Psych = 3 pts (no significant personality dysfunction/mental illness; good communication with clinic)         Chem Hlth = 3 pts (no history of chemical dependency; not drug-focused)       Reliability = 3 pts (highly reliable with meds, appointments, treatments)       Social = 3 pts (supportive family/close relationships; involved in work/school; no isolation)       (Psych + Chem hlth + Reliability + Social) = 17    Efficacy = 2 pts (moderate benefit/function; low med dose; too early/not tried meds)    DIRE Score = 19        7-13: likely NOT suitable candidate for long-term opioid analgesia       14-21: may be a suitable candidate for long-term opioid analgesia    DIAGNOSTIC RESULTS:    Jaw x-rays ordered     PAIN RELAVENT CONDITIONS:   1.  Bilateral jaw pain, clicking with headaches  2.  Rheumatoid arthritis: dx in childhood, controlled on Humira biweekly       ASSESSMENT AND PLAN:    (M26.623) Bilateral temporomandibular joint pain  (primary encounter diagnosis)  (R51.9) Mixed headache  Comment: ***    Messages left for both TMD clinic and Physical Medicine re: which is more appropriate to see William at this point.   Plan:   XR Mandible G/E 4 Views  diclofenac (VOLTAREN) 1 % topical gel      PATIENT INSTRUCTIONS:     Diagnosis reviewed, treatment option addressed, and risk/benefits discussed.  Self-care instructions given.  I am recommending a multidisciplinary treatment plan to help this patient better manage pain.    Remember to request ALL medication refills 5 days before you run out.     30 minutes Video or Clinic follow-up with JACE Luke, NP-C 1 month   Imaging: Jaw x-rays: [facility]): [phone]  Procedures recommended: Heather will look into Botox injections   Referrals: TMJ clinic vs Physical Medicine referral   Medication Management :   Diclofenac 1% gel: apply to affected joints 3-4 x/day. Sent to pharmacy.      I have reviewed the note as documented  above.  This accurately captures the substance of my conversation with the patient.  A total of *** minutes of preparation, care, and consultation were spent on this visit today.     JACE Garcia, NP-C  [facility]     (Information in italics and blue color are taken from previous pain and consulting medical providers notes and are documented as such)

## 2025-07-24 ENCOUNTER — LAB (OUTPATIENT)
Dept: LAB | Facility: CLINIC | Age: 25
End: 2025-07-24
Payer: COMMERCIAL

## 2025-07-24 ENCOUNTER — OFFICE VISIT (OUTPATIENT)
Dept: PALLIATIVE MEDICINE | Facility: OTHER | Age: 25
End: 2025-07-24
Attending: PHYSICIAN ASSISTANT
Payer: COMMERCIAL

## 2025-07-24 VITALS
OXYGEN SATURATION: 100 % | WEIGHT: 130 LBS | BODY MASS INDEX: 21.63 KG/M2 | SYSTOLIC BLOOD PRESSURE: 118 MMHG | DIASTOLIC BLOOD PRESSURE: 76 MMHG | HEART RATE: 74 BPM

## 2025-07-24 DIAGNOSIS — R51.9 MIXED HEADACHE: ICD-10-CM

## 2025-07-24 DIAGNOSIS — M05.79 RHEUMATOID ARTHRITIS INVOLVING MULTIPLE SITES WITH POSITIVE RHEUMATOID FACTOR (H): ICD-10-CM

## 2025-07-24 DIAGNOSIS — M25.50 ARTHRALGIA, UNSPECIFIED JOINT: ICD-10-CM

## 2025-07-24 DIAGNOSIS — M26.623 BILATERAL TEMPOROMANDIBULAR JOINT PAIN: Primary | ICD-10-CM

## 2025-07-24 DIAGNOSIS — N17.9 AKI (ACUTE KIDNEY INJURY): ICD-10-CM

## 2025-07-24 DIAGNOSIS — R76.8 CYCLIC CITRULLINATED PEPTIDE (CCP) ANTIBODY POSITIVE: ICD-10-CM

## 2025-07-24 LAB
CREAT SERPL-MCNC: 0.84 MG/DL (ref 0.51–0.95)
EGFRCR SERPLBLD CKD-EPI 2021: >90 ML/MIN/1.73M2

## 2025-07-24 PROCEDURE — G2211 COMPLEX E/M VISIT ADD ON: HCPCS | Performed by: NURSE PRACTITIONER

## 2025-07-24 PROCEDURE — 99205 OFFICE O/P NEW HI 60 MIN: CPT | Performed by: NURSE PRACTITIONER

## 2025-07-24 PROCEDURE — 3074F SYST BP LT 130 MM HG: CPT | Performed by: NURSE PRACTITIONER

## 2025-07-24 PROCEDURE — 99213 OFFICE O/P EST LOW 20 MIN: CPT | Performed by: NURSE PRACTITIONER

## 2025-07-24 PROCEDURE — 1125F AMNT PAIN NOTED PAIN PRSNT: CPT | Performed by: NURSE PRACTITIONER

## 2025-07-24 PROCEDURE — 3078F DIAST BP <80 MM HG: CPT | Performed by: NURSE PRACTITIONER

## 2025-07-24 ASSESSMENT — ANXIETY QUESTIONNAIRES
1. FEELING NERVOUS, ANXIOUS, OR ON EDGE: NOT AT ALL
7. FEELING AFRAID AS IF SOMETHING AWFUL MIGHT HAPPEN: NOT AT ALL
GAD7 TOTAL SCORE: 0
GAD7 TOTAL SCORE: 0
2. NOT BEING ABLE TO STOP OR CONTROL WORRYING: NOT AT ALL
7. FEELING AFRAID AS IF SOMETHING AWFUL MIGHT HAPPEN: NOT AT ALL
5. BEING SO RESTLESS THAT IT IS HARD TO SIT STILL: NOT AT ALL
IF YOU CHECKED OFF ANY PROBLEMS ON THIS QUESTIONNAIRE, HOW DIFFICULT HAVE THESE PROBLEMS MADE IT FOR YOU TO DO YOUR WORK, TAKE CARE OF THINGS AT HOME, OR GET ALONG WITH OTHER PEOPLE: NOT DIFFICULT AT ALL
8. IF YOU CHECKED OFF ANY PROBLEMS, HOW DIFFICULT HAVE THESE MADE IT FOR YOU TO DO YOUR WORK, TAKE CARE OF THINGS AT HOME, OR GET ALONG WITH OTHER PEOPLE?: NOT DIFFICULT AT ALL
GAD7 TOTAL SCORE: 0
4. TROUBLE RELAXING: NOT AT ALL
6. BECOMING EASILY ANNOYED OR IRRITABLE: NOT AT ALL
3. WORRYING TOO MUCH ABOUT DIFFERENT THINGS: NOT AT ALL

## 2025-07-24 ASSESSMENT — PAIN SCALES - PAIN ENJOYMENT GENERAL ACTIVITY SCALE (PEG)
PEG_TOTALSCORE: 1
INTERFERED_GENERAL_ACTIVITY: 0
INTERFERED_GENERAL_ACTIVITY: 0 - DOES NOT INTERFERE
PEG_TOTALSCORE: 1
INTERFERED_ENJOYMENT_LIFE: 1
AVG_PAIN_PASTWEEK: 2
AVG_PAIN_PASTWEEK: 2
INTERFERED_ENJOYMENT_LIFE: 1

## 2025-07-24 ASSESSMENT — PAIN SCALES - GENERAL: PAINLEVEL_OUTOF10: MILD PAIN (2)

## 2025-07-24 NOTE — PROGRESS NOTES
Patient presents to the clinic today for a visit  with JACE Jara CNP      {Rooming staff  Please complete the PEG Assessment  Assess Pain, Function, and Quality of Life. Complete every pain visit :461267}      7/24/2025    10:25 AM   PEG Score   PEG Total Score 3.33       UDS/CSA-new    Medications-new    QUESTIONS:    Rochelle Mccullough MA  [facility] [place] [facility]    Answers submitted by the patient for this visit:  Patient Health Questionnaire (G7) (Submitted on 7/24/2025)  CATHY 7 TOTAL SCORE: 0

## 2025-07-24 NOTE — PATIENT INSTRUCTIONS
After Visit Instructions:     Thank you for coming to [facility] for your care. It is my goal to partner with you to help you reach your optimal state of health.   Continue daily self-care, identifying contributing factors, and monitoring variations in pain level. Continue to integrate self-care into your life.      30 minutes Video or Clinic follow-up with JACE Luke NP-C 1 month   Imaging: Jaw x-rays  Procedures recommended: Heather will look into Botox injections   Referrals: TMJ clinic vs Physical Medicine referral   Medication Management :   Diclofenac 1% gel: apply to affected joints 3-4 x/day      JACE Garcia, NP-C  [facility]  [facility] - Monday, Thursday and Friday  [facility] - Tuesday      Be sure to request ALL medication refills 5 days prior to the due date whether or not you will see your medical provider in an appointment before the due date.      Do not expect same day refills. If you do not plan ahead you may run out of medications.     Early refills are not provided.  It is your responsibility to manage your medications responsibility and keep them safely stored. Lost or destroyed medications WILL NOT be replaced    Scheduling/Clinic telephone Number for ALL locations:  [phone]    After Hours On-Call Service:  [phone]    Call with any questions about your care and for scheduling assistance.   Calls are returned Monday through Friday between 8 AM and 4:00 PM. We usually get back to you within 2 business days depending on the issue/request.    If we are prescribing your medications:  For opioid medication refills, call the clinic or send a tinyclues message 7 days in advance.  Please include:  Your name and date of birth.   Name of requested medication  Name of the pharmacy.  For non-opioid medications, call your pharmacy directly to request a refill. Please allow 3-4 days to be processed.   Per [place] State Law:  All  controlled substance prescriptions must be filled within 30 days of being written.    For those controlled substances allowing refills, pickup must occur within 30 days of last fill.      We believe regular attendance is key to your success in our program!    Any time you are unable to keep your appointment we ask that you call us at least 24 hours in advance to cancel.This will allow us to offer the appointment time to another patient.   Multiple missed appointments may lead to dismissal from the clinic.

## 2025-07-26 LAB
DSDNA AB SER-ACNC: 7.1 IU/ML
ENA SM IGG SER IA-ACNC: <0.7 U/ML
ENA SM IGG SER IA-ACNC: NEGATIVE
ENA SS-A AB SER IA-ACNC: 0.6 U/ML
ENA SS-A AB SER IA-ACNC: NEGATIVE
ENA SS-B IGG SER IA-ACNC: <0.6 U/ML
ENA SS-B IGG SER IA-ACNC: NEGATIVE
U1 SNRNP IGG SER IA-ACNC: 2 U/ML
U1 SNRNP IGG SER IA-ACNC: NEGATIVE

## 2025-07-28 ENCOUNTER — MYC MEDICAL ADVICE (OUTPATIENT)
Dept: PALLIATIVE MEDICINE | Facility: OTHER | Age: 25
End: 2025-07-28
Payer: COMMERCIAL

## 2025-08-11 ENCOUNTER — MYC MEDICAL ADVICE (OUTPATIENT)
Dept: PALLIATIVE MEDICINE | Facility: OTHER | Age: 25
End: 2025-08-11
Payer: COMMERCIAL

## 2025-08-11 DIAGNOSIS — M26.623 BILATERAL TEMPOROMANDIBULAR JOINT PAIN: ICD-10-CM

## 2025-08-11 DIAGNOSIS — G89.29 CHRONIC JAW PAIN: Primary | ICD-10-CM

## 2025-08-11 DIAGNOSIS — R68.84 CHRONIC JAW PAIN: Primary | ICD-10-CM

## 2025-08-11 DIAGNOSIS — R51.9 MIXED HEADACHE: ICD-10-CM

## 2025-08-18 ENCOUNTER — MYC REFILL (OUTPATIENT)
Dept: PEDIATRICS | Facility: CLINIC | Age: 25
End: 2025-08-18
Payer: COMMERCIAL

## 2025-08-18 DIAGNOSIS — Z30.09 GENERAL COUNSELING FOR PRESCRIPTION OF ORAL CONTRACEPTIVES: ICD-10-CM

## 2025-08-19 ENCOUNTER — MYC MEDICAL ADVICE (OUTPATIENT)
Dept: PEDIATRICS | Facility: CLINIC | Age: 25
End: 2025-08-19
Payer: COMMERCIAL

## 2025-08-19 RX ORDER — DROSPIRENONE AND ETHINYL ESTRADIOL 0.02-3(28)
1 KIT ORAL DAILY
Qty: 84 TABLET | Refills: 2 | Status: SHIPPED | OUTPATIENT
Start: 2025-08-19

## 2025-08-25 ENCOUNTER — TELEPHONE (OUTPATIENT)
Dept: PALLIATIVE MEDICINE | Facility: CLINIC | Age: 25
End: 2025-08-25
Payer: COMMERCIAL